# Patient Record
Sex: FEMALE | Race: BLACK OR AFRICAN AMERICAN | NOT HISPANIC OR LATINO | URBAN - METROPOLITAN AREA
[De-identification: names, ages, dates, MRNs, and addresses within clinical notes are randomized per-mention and may not be internally consistent; named-entity substitution may affect disease eponyms.]

---

## 2019-01-02 ENCOUNTER — EMERGENCY (EMERGENCY)
Facility: HOSPITAL | Age: 24
LOS: 1 days | Discharge: HOME | End: 2019-01-02
Attending: EMERGENCY MEDICINE | Admitting: EMERGENCY MEDICINE
Payer: SELF-PAY

## 2019-01-02 VITALS
SYSTOLIC BLOOD PRESSURE: 121 MMHG | TEMPERATURE: 98 F | HEART RATE: 78 BPM | DIASTOLIC BLOOD PRESSURE: 61 MMHG | OXYGEN SATURATION: 100 % | RESPIRATION RATE: 15 BRPM

## 2019-01-02 VITALS
TEMPERATURE: 98 F | HEART RATE: 75 BPM | DIASTOLIC BLOOD PRESSURE: 68 MMHG | RESPIRATION RATE: 18 BRPM | OXYGEN SATURATION: 100 % | SYSTOLIC BLOOD PRESSURE: 126 MMHG

## 2019-01-02 LAB
ALBUMIN SERPL ELPH-MCNC: 3.7 G/DL — SIGNIFICANT CHANGE UP (ref 3.3–5)
ALP SERPL-CCNC: 86 U/L — SIGNIFICANT CHANGE UP (ref 40–120)
ALT FLD-CCNC: 14 U/L — SIGNIFICANT CHANGE UP (ref 4–33)
APPEARANCE UR: CLEAR — SIGNIFICANT CHANGE UP
AST SERPL-CCNC: 18 U/L — SIGNIFICANT CHANGE UP (ref 4–32)
BACTERIA # UR AUTO: SIGNIFICANT CHANGE UP
BASOPHILS # BLD AUTO: 0.03 K/UL — SIGNIFICANT CHANGE UP (ref 0–0.2)
BASOPHILS NFR BLD AUTO: 0.7 % — SIGNIFICANT CHANGE UP (ref 0–2)
BILIRUB SERPL-MCNC: 0.3 MG/DL — SIGNIFICANT CHANGE UP (ref 0.2–1.2)
BILIRUB UR-MCNC: NEGATIVE — SIGNIFICANT CHANGE UP
BLOOD UR QL VISUAL: NEGATIVE — SIGNIFICANT CHANGE UP
BUN SERPL-MCNC: 11 MG/DL — SIGNIFICANT CHANGE UP (ref 7–23)
C TRACH RRNA SPEC QL NAA+PROBE: SIGNIFICANT CHANGE UP
CALCIUM SERPL-MCNC: 8.8 MG/DL — SIGNIFICANT CHANGE UP (ref 8.4–10.5)
CHLORIDE SERPL-SCNC: 105 MMOL/L — SIGNIFICANT CHANGE UP (ref 98–107)
CO2 SERPL-SCNC: 21 MMOL/L — LOW (ref 22–31)
COLOR SPEC: SIGNIFICANT CHANGE UP
CREAT SERPL-MCNC: 0.74 MG/DL — SIGNIFICANT CHANGE UP (ref 0.5–1.3)
EOSINOPHIL # BLD AUTO: 0.27 K/UL — SIGNIFICANT CHANGE UP (ref 0–0.5)
EOSINOPHIL NFR BLD AUTO: 5.9 % — SIGNIFICANT CHANGE UP (ref 0–6)
GLUCOSE SERPL-MCNC: 99 MG/DL — SIGNIFICANT CHANGE UP (ref 70–99)
GLUCOSE UR-MCNC: NEGATIVE — SIGNIFICANT CHANGE UP
HCT VFR BLD CALC: 34.3 % — LOW (ref 34.5–45)
HGB BLD-MCNC: 11 G/DL — LOW (ref 11.5–15.5)
HYALINE CASTS # UR AUTO: NEGATIVE — SIGNIFICANT CHANGE UP
IMM GRANULOCYTES # BLD AUTO: 0.02 # — SIGNIFICANT CHANGE UP
IMM GRANULOCYTES NFR BLD AUTO: 0.4 % — SIGNIFICANT CHANGE UP (ref 0–1.5)
KETONES UR-MCNC: NEGATIVE — SIGNIFICANT CHANGE UP
LEUKOCYTE ESTERASE UR-ACNC: SIGNIFICANT CHANGE UP
LIDOCAIN IGE QN: 26.7 U/L — SIGNIFICANT CHANGE UP (ref 7–60)
LYMPHOCYTES # BLD AUTO: 0.99 K/UL — LOW (ref 1–3.3)
LYMPHOCYTES # BLD AUTO: 21.5 % — SIGNIFICANT CHANGE UP (ref 13–44)
MCHC RBC-ENTMCNC: 26.9 PG — LOW (ref 27–34)
MCHC RBC-ENTMCNC: 32.1 % — SIGNIFICANT CHANGE UP (ref 32–36)
MCV RBC AUTO: 83.9 FL — SIGNIFICANT CHANGE UP (ref 80–100)
MONOCYTES # BLD AUTO: 0.73 K/UL — SIGNIFICANT CHANGE UP (ref 0–0.9)
MONOCYTES NFR BLD AUTO: 15.8 % — HIGH (ref 2–14)
N GONORRHOEA RRNA SPEC QL NAA+PROBE: SIGNIFICANT CHANGE UP
NEUTROPHILS # BLD AUTO: 2.57 K/UL — SIGNIFICANT CHANGE UP (ref 1.8–7.4)
NEUTROPHILS NFR BLD AUTO: 55.7 % — SIGNIFICANT CHANGE UP (ref 43–77)
NITRITE UR-MCNC: NEGATIVE — SIGNIFICANT CHANGE UP
NRBC # FLD: 0 — SIGNIFICANT CHANGE UP
PH UR: 6.5 — SIGNIFICANT CHANGE UP (ref 5–8)
PLATELET # BLD AUTO: 144 K/UL — LOW (ref 150–400)
PMV BLD: 12.6 FL — SIGNIFICANT CHANGE UP (ref 7–13)
POTASSIUM SERPL-MCNC: 4.5 MMOL/L — SIGNIFICANT CHANGE UP (ref 3.5–5.3)
POTASSIUM SERPL-SCNC: 4.5 MMOL/L — SIGNIFICANT CHANGE UP (ref 3.5–5.3)
PROT SERPL-MCNC: 6.4 G/DL — SIGNIFICANT CHANGE UP (ref 6–8.3)
PROT UR-MCNC: NEGATIVE — SIGNIFICANT CHANGE UP
RBC # BLD: 4.09 M/UL — SIGNIFICANT CHANGE UP (ref 3.8–5.2)
RBC # FLD: 13.5 % — SIGNIFICANT CHANGE UP (ref 10.3–14.5)
RBC CASTS # UR COMP ASSIST: SIGNIFICANT CHANGE UP (ref 0–?)
SODIUM SERPL-SCNC: 138 MMOL/L — SIGNIFICANT CHANGE UP (ref 135–145)
SP GR SPEC: 1.01 — SIGNIFICANT CHANGE UP (ref 1–1.04)
SPECIMEN SOURCE: SIGNIFICANT CHANGE UP
SQUAMOUS # UR AUTO: SIGNIFICANT CHANGE UP
UROBILINOGEN FLD QL: NORMAL — SIGNIFICANT CHANGE UP
WBC # BLD: 4.61 K/UL — SIGNIFICANT CHANGE UP (ref 3.8–10.5)
WBC # FLD AUTO: 4.61 K/UL — SIGNIFICANT CHANGE UP (ref 3.8–10.5)
WBC UR QL: HIGH (ref 0–?)

## 2019-01-02 PROCEDURE — 99053 MED SERV 10PM-8AM 24 HR FAC: CPT

## 2019-01-02 PROCEDURE — 99283 EMERGENCY DEPT VISIT LOW MDM: CPT | Mod: 25

## 2019-01-02 RX ORDER — CEPHALEXIN 500 MG
500 CAPSULE ORAL ONCE
Refills: 0 | Status: COMPLETED | OUTPATIENT
Start: 2019-01-02 | End: 2019-01-02

## 2019-01-02 RX ORDER — CEPHALEXIN 500 MG
1 CAPSULE ORAL
Qty: 14 | Refills: 0
Start: 2019-01-02 | End: 2019-01-08

## 2019-01-02 RX ADMIN — Medication 500 MILLIGRAM(S): at 04:40

## 2019-01-02 NOTE — ED PROVIDER NOTE - ATTENDING CONTRIBUTION TO CARE
AJM: Patient seen with resident and agree with above note. 25yo F with no significant PMH p/w abdominal and pelvic pain. pelvic pain has been on/off for a month but today patient began having midline, suprapubic abdominal pain as well. Denies fever or chills or dysuria but reports incomplete voiding and increased frequency. Patient reports no significant change in vaginal discharge but potential change in odor. unremarkable pelvic exam with RN chaperone. IUD strings palpable. will check palbs, ua, chlam/gc AJM: Patient seen with resident and agree with above note. 23yo F with no significant PMH p/w abdominal and pelvic pain. pelvic pain has been on/off for a month but today patient began having midline, suprapubic abdominal pain as well. Denies fever or chills or dysuria but reports incomplete voiding and increased frequency. Patient reports no significant change in vaginal discharge but potential change in odor. unremarkable pelvic exam with RN chaperone. IUD strings palpable. will check palbs, ua, chlam/gc

## 2019-01-02 NOTE — ED PROVIDER NOTE - OBJECTIVE STATEMENT
23yo F with no significant PMH p/w abdominal and pelvic pain. pelvic pain has been on/off for a month but today patient began having midline, suprapubic abdominal pain as well. Denies fever or chills or dysuria but reports incomplete voiding and increased frequency. Patient reports no significant change in vaginal discharge but potential change in odor 25yo F with no significant PMH p/w abdominal and pelvic pain. pelvic pain has been on/off for a month but today patient began having midline, suprapubic abdominal pain as well. Denies fever or chills or dysuria but reports incomplete voiding and increased frequency. Patient reports no significant change in vaginal discharge but potential change in odor

## 2019-01-02 NOTE — ED PROVIDER NOTE - NS ED ROS FT
Positive: abdominal pain, pelvic pain, incomplete voiding, increased frequency    All other review of systems is negative unless indicated above.

## 2019-01-02 NOTE — ED PROVIDER NOTE - PHYSICAL EXAMINATION
GENERAL: No acute distress, well-developed  HEAD:  Atraumatic, Normocephalic  ENT: EOMI, conjunctiva and sclera clear, Neck supple, No JVD, moist mucosa  CHEST/LUNG: Clear to auscultation bilaterally  HEART: Regular rate and rhythm; No murmurs, rubs, or gallops  ABDOMEN: Soft, Nontender, Nondistended; Bowel sounds present  EXTREMITIES:  No clubbing, cyanosis, or edema  PSYCH: Nl behavior, nl affect  NEUROLOGY: AAOx3, non-focal, cranial nerves intact  SKIN: Normal color, No rashes or lesions  : no malodorous discharge, bleeding on speculum exam. IUD strings palpated

## 2019-01-02 NOTE — ED PROVIDER NOTE - NSFOLLOWUPINSTRUCTIONS_ED_ALL_ED_FT
Please continue taking your medications as prescribed.  Please follow-up with your PMD within 1 week.  Please return to the ED and seek immediate medical care if you experience new or worsening symptoms including: fever, chills, flank pain.

## 2019-01-02 NOTE — ED PROVIDER NOTE - MEDICAL DECISION MAKING DETAILS
23yo F with IUD p/w abd/pelvic pain. Non-toxic appearance and no significant tenderness on palpation. Pelvic exam w/o significant discharge or bleeding. UA w/ LE and WBC, with symptoms concern for UTI/Cysititis. Check blood work, GC/Chlamydia. Likely discharge pending workup. 25yo F with IUD p/w abd/pelvic pain. Non-toxic appearance and no significant tenderness on palpation. Pelvic exam w/o significant discharge or bleeding. UA w/ LE and WBC, with symptoms concern for UTI/Cysititis. Check blood work, GC/Chlamydia. Likely discharge pending workup.

## 2019-01-02 NOTE — ED ADULT NURSE NOTE - NSIMPLEMENTINTERV_GEN_ALL_ED
Implemented All Universal Safety Interventions:  [place] to call system. Call bell, personal items and telephone within reach. Instruct patient to call for assistance. Room bathroom lighting operational. Non-slip footwear when patient is off stretcher. Physically safe environment: no spills, clutter or unnecessary equipment. Stretcher in lowest position, wheels locked, appropriate side rails in place. Implemented All Universal Safety Interventions:  [place] to call system. Call bell, personal items and telephone within reach. Instruct patient to call for assistance. Room bathroom lighting operational. Non-slip footwear when patient is off stretcher. Physically safe environment: no spills, clutter or unnecessary equipment. Stretcher in lowest position, wheels locked, appropriate side rails in place. Implemented All Universal Safety Interventions:  [place] to call system. Call bell, personal items and telephone within reach. Instruct patient to call for assistance. Room bathroom lighting operational. Non-slip footwear when patient is off stretcher. Physically safe environment: no spills, clutter or unnecessary equipment. Stretcher in lowest position, wheels locked, appropriate side rails in place.

## 2019-01-02 NOTE — ED ADULT TRIAGE NOTE - CHIEF COMPLAINT QUOTE
Pt. w/ n PMH c/o abdominal pain with pelvic pain. Pt. states she has had pelvic pain for 1x month but today started to have abdominal pain. Pt. denies any N/V , denies any vag bleeding and burning during urination. Pt. states she has had 3x years with a IUD and states she is concern pain could be related to IUD. Pt. appears in NAD in triage.

## 2019-01-03 LAB
BACTERIA UR CULT: SIGNIFICANT CHANGE UP
SPECIMEN SOURCE: SIGNIFICANT CHANGE UP

## 2019-01-05 LAB
BACTERIA GENITAL AEROBE CULT: SIGNIFICANT CHANGE UP

## 2019-03-22 ENCOUNTER — EMERGENCY (EMERGENCY)
Facility: HOSPITAL | Age: 24
LOS: 1 days | Discharge: HOME | End: 2019-03-22
Attending: EMERGENCY MEDICINE | Admitting: EMERGENCY MEDICINE
Payer: SELF-PAY

## 2019-03-22 VITALS
SYSTOLIC BLOOD PRESSURE: 127 MMHG | HEART RATE: 66 BPM | OXYGEN SATURATION: 100 % | DIASTOLIC BLOOD PRESSURE: 79 MMHG | TEMPERATURE: 98 F | RESPIRATION RATE: 18 BRPM

## 2019-03-22 PROCEDURE — 99283 EMERGENCY DEPT VISIT LOW MDM: CPT

## 2019-03-22 RX ORDER — AMOXICILLIN 250 MG/5ML
1 SUSPENSION, RECONSTITUTED, ORAL (ML) ORAL
Qty: 20 | Refills: 0
Start: 2019-03-22 | End: 2019-03-31

## 2019-03-22 RX ORDER — OXYCODONE AND ACETAMINOPHEN 5; 325 MG/1; MG/1
1 TABLET ORAL
Qty: 9 | Refills: 0
Start: 2019-03-22 | End: 2019-03-24

## 2019-03-23 RX ORDER — AMOXICILLIN 250 MG/5ML
500 SUSPENSION, RECONSTITUTED, ORAL (ML) ORAL ONCE
Refills: 0 | Status: COMPLETED | OUTPATIENT
Start: 2019-03-23 | End: 2019-03-23

## 2019-03-23 RX ORDER — OXYCODONE AND ACETAMINOPHEN 5; 325 MG/1; MG/1
1 TABLET ORAL ONCE
Refills: 0 | Status: DISCONTINUED | OUTPATIENT
Start: 2019-03-23 | End: 2019-03-23

## 2019-03-23 RX ADMIN — Medication 500 MILLIGRAM(S): at 00:11

## 2019-03-23 RX ADMIN — OXYCODONE AND ACETAMINOPHEN 1 TABLET(S): 5; 325 TABLET ORAL at 00:11

## 2019-03-23 NOTE — ED ADULT NURSE NOTE - NSFALLRSKASSESSDT_ED_ALL_ED
23-Mar-2019 00:10 Coronary artery disease involving native coronary artery of native heart without angina pectoris

## 2019-03-23 NOTE — ED PROVIDER NOTE - CLINICAL SUMMARY MEDICAL DECISION MAKING FREE TEXT BOX
adult F with dental caries, no signs of abscess.  normal VS.  plan:  rx percocet x 3d, amoxicillin.  dental referral. return precautions.

## 2019-03-23 NOTE — ED PROVIDER NOTE - NSFOLLOWUPINSTRUCTIONS_ED_ALL_ED_FT
Call the [facility] Dental clinic tomorrow morning to arrange follow up .  Take the antibiotics to limit the dental infection.  Take ibuprofen 400mg, every 6 hrs, as needed for dental pain.  You may take percocet (5/325) every 8hrs, as needed for breakthrough dental pain. Call the [facility] Dental clinic tomorrow morning to arrange follow up .  Take the antibiotics to limit the dental infection.  Take ibuprofen 400mg, every 6 hrs, as needed for dental pain.  You may take percocet (5/325) every 8hrs, as needed for breakthrough dental pain. Call the [facility] Dental clinic tomorrow morning to arrange follow up .  Take the antibiotics to limit the dental infection.  Take ibuprofen 400mg, every 6 hrs, as needed for dental pain.  You may take percocet (5/325) every 8hrs, as needed for breakthrough dental pain.

## 2019-03-23 NOTE — ED ADULT NURSE NOTE - OBJECTIVE STATEMENT
pt brought into room 10c. A&OX4 ambulatory self care female presents to the ED for dental pain that has worsens for months patients mouth minimally swollen. PT appears uncomfortable. UCG Negative. Medication given as ordered.

## 2019-03-23 NOTE — ED ADULT NURSE NOTE - NSIMPLEMENTINTERV_GEN_ALL_ED
Implemented All Universal Safety Interventions:  [place] to call system. Call bell, personal items and telephone within reach. Instruct patient to call for assistance. Room bathroom lighting operational. Non-slip footwear when patient is off stretcher. Physically safe environment: no spills, clutter or unnecessary equipment. Stretcher in lowest position, wheels locked, appropriate side rails in place. Implemented All Universal Safety Interventions:  [place] to call system. Call bell, personal items and telephone within reach. Instruct patient to call for assistance. Room bathroom lighting operational. Non-slip footwear when patient is off stretcher. Physically safe environment: no spills, clutter or unnecessary equipment. Stretcher in lowest position, wheels locked, appropriate side rails in place. Implemented All Universal Safety Interventions:  [place] to call system. Call bell, personal items and telephone within reach. Instruct patient to call for assistance. Room bathroom lighting operational. Non-slip footwear when patient is off stretcher. Physically safe environment: no spills, clutter or unnecessary equipment. Stretcher in lowest position, wheels locked, appropriate side rails in place.

## 2019-03-23 NOTE — ED PROVIDER NOTE - NS ED MD EM SELECTION
82721 Exp Problem Focused - Mod. Complex 53171 Exp Problem Focused - Mod. Complex 12792 Exp Problem Focused - Mod. Complex

## 2019-03-23 NOTE — ED PROVIDER NOTE - OBJECTIVE STATEMENT
Duration: weeks.  Quality: throbbing.  Location:  L upper 1st bicuspid, L lower 2nd bicuspid.  Associated Sx: no f/c, no nausea/vomiting, no facial swelling.    Hasn't seen dentist b/c doesn't have insurance (until today).

## 2019-03-23 NOTE — ED PROVIDER NOTE - NSFOLLOWUPCLINICS_GEN_ALL_ED_FT
[facility]  Dental  [address] [place] [address]  Phone: [phone]  Fax:   Follow Up Time: [facility]  Dental  [address] [place] [id number]  Phone: [phone]  Fax:   Follow Up Time: [facility]  Dental  [address] [place] [address]  Phone: [phone]  Fax:   Follow Up Time:

## 2021-05-03 NOTE — ED ADULT NURSE NOTE - CHIEF COMPLAINT QUOTE
Health Maintenance Due   Topic Date Due   • Shingles Vaccine (1 of 2) Never done   • Depression Screening  10/20/2021       Patient is due for topics as listed above but is not proceeding with Immunization(s) Shingles at this time.          Pt. w/ n PMH c/o abdominal pain with pelvic pain. Pt. states she has had pelvic pain for 1x month but today started to have abdominal pain. Pt. denies any N/V , denies any vag bleeding and burning during urination. Pt. states she has had 3x years with a IUD and states she is concern pain could be related to IUD. Pt. appears in NAD in triage.

## 2023-08-29 ENCOUNTER — EMERGENCY (EMERGENCY)
Facility: HOSPITAL | Age: 28
LOS: 1 days | Discharge: HOME | End: 2023-08-29
Admitting: STUDENT IN AN ORGANIZED HEALTH CARE EDUCATION/TRAINING PROGRAM
Payer: MEDICAID

## 2023-08-29 VITALS
DIASTOLIC BLOOD PRESSURE: 79 MMHG | OXYGEN SATURATION: 100 % | SYSTOLIC BLOOD PRESSURE: 115 MMHG | RESPIRATION RATE: 16 BRPM | TEMPERATURE: 98 F | HEART RATE: 60 BPM

## 2023-08-29 VITALS
OXYGEN SATURATION: 99 % | DIASTOLIC BLOOD PRESSURE: 95 MMHG | HEART RATE: 77 BPM | TEMPERATURE: 99 F | SYSTOLIC BLOOD PRESSURE: 140 MMHG | RESPIRATION RATE: 16 BRPM

## 2023-08-29 LAB
ALBUMIN SERPL ELPH-MCNC: 4 G/DL — SIGNIFICANT CHANGE UP (ref 3.3–5)
ALP SERPL-CCNC: 87 U/L — SIGNIFICANT CHANGE UP (ref 40–120)
ALT FLD-CCNC: 11 U/L — SIGNIFICANT CHANGE UP (ref 4–33)
ANION GAP SERPL CALC-SCNC: 15 MMOL/L — HIGH (ref 7–14)
APPEARANCE UR: ABNORMAL
AST SERPL-CCNC: 26 U/L — SIGNIFICANT CHANGE UP (ref 4–32)
BASOPHILS # BLD AUTO: 0.01 K/UL — SIGNIFICANT CHANGE UP (ref 0–0.2)
BASOPHILS NFR BLD AUTO: 0.3 % — SIGNIFICANT CHANGE UP (ref 0–2)
BILIRUB SERPL-MCNC: 0.5 MG/DL — SIGNIFICANT CHANGE UP (ref 0.2–1.2)
BILIRUB UR-MCNC: NEGATIVE — SIGNIFICANT CHANGE UP
BLD GP AB SCN SERPL QL: NEGATIVE — SIGNIFICANT CHANGE UP
BUN SERPL-MCNC: 9 MG/DL — SIGNIFICANT CHANGE UP (ref 7–23)
CALCIUM SERPL-MCNC: 8.8 MG/DL — SIGNIFICANT CHANGE UP (ref 8.4–10.5)
CHLORIDE SERPL-SCNC: 103 MMOL/L — SIGNIFICANT CHANGE UP (ref 98–107)
CO2 SERPL-SCNC: 18 MMOL/L — LOW (ref 22–31)
COLOR SPEC: YELLOW — SIGNIFICANT CHANGE UP
CREAT SERPL-MCNC: 0.73 MG/DL — SIGNIFICANT CHANGE UP (ref 0.5–1.3)
DIFF PNL FLD: ABNORMAL
EGFR: 113 ML/MIN/1.73M2 — SIGNIFICANT CHANGE UP
EOSINOPHIL # BLD AUTO: 0.09 K/UL — SIGNIFICANT CHANGE UP (ref 0–0.5)
EOSINOPHIL NFR BLD AUTO: 2.5 % — SIGNIFICANT CHANGE UP (ref 0–6)
GLUCOSE SERPL-MCNC: 96 MG/DL — SIGNIFICANT CHANGE UP (ref 70–99)
GLUCOSE UR QL: NEGATIVE MG/DL — SIGNIFICANT CHANGE UP
HCG SERPL-ACNC: 44.6 MIU/ML — SIGNIFICANT CHANGE UP
HCT VFR BLD CALC: 40.3 % — SIGNIFICANT CHANGE UP (ref 34.5–45)
HGB BLD-MCNC: 13.3 G/DL — SIGNIFICANT CHANGE UP (ref 11.5–15.5)
IANC: 2.45 K/UL — SIGNIFICANT CHANGE UP (ref 1.8–7.4)
IMM GRANULOCYTES NFR BLD AUTO: 0.6 % — SIGNIFICANT CHANGE UP (ref 0–0.9)
KETONES UR-MCNC: ABNORMAL MG/DL
LEUKOCYTE ESTERASE UR-ACNC: ABNORMAL
LYMPHOCYTES # BLD AUTO: 0.73 K/UL — LOW (ref 1–3.3)
LYMPHOCYTES # BLD AUTO: 20.6 % — SIGNIFICANT CHANGE UP (ref 13–44)
MCHC RBC-ENTMCNC: 29.4 PG — SIGNIFICANT CHANGE UP (ref 27–34)
MCHC RBC-ENTMCNC: 33 GM/DL — SIGNIFICANT CHANGE UP (ref 32–36)
MCV RBC AUTO: 89 FL — SIGNIFICANT CHANGE UP (ref 80–100)
MONOCYTES # BLD AUTO: 0.25 K/UL — SIGNIFICANT CHANGE UP (ref 0–0.9)
MONOCYTES NFR BLD AUTO: 7 % — SIGNIFICANT CHANGE UP (ref 2–14)
NEUTROPHILS # BLD AUTO: 2.45 K/UL — SIGNIFICANT CHANGE UP (ref 1.8–7.4)
NEUTROPHILS NFR BLD AUTO: 69 % — SIGNIFICANT CHANGE UP (ref 43–77)
NITRITE UR-MCNC: NEGATIVE — SIGNIFICANT CHANGE UP
NRBC # BLD: 0 /100 WBCS — SIGNIFICANT CHANGE UP (ref 0–0)
NRBC # FLD: 0 K/UL — SIGNIFICANT CHANGE UP (ref 0–0)
PH UR: 6 — SIGNIFICANT CHANGE UP (ref 5–8)
PLATELET # BLD AUTO: 187 K/UL — SIGNIFICANT CHANGE UP (ref 150–400)
POTASSIUM SERPL-MCNC: 4.7 MMOL/L — SIGNIFICANT CHANGE UP (ref 3.5–5.3)
POTASSIUM SERPL-SCNC: 4.7 MMOL/L — SIGNIFICANT CHANGE UP (ref 3.5–5.3)
PROT SERPL-MCNC: 7.3 G/DL — SIGNIFICANT CHANGE UP (ref 6–8.3)
PROT UR-MCNC: SIGNIFICANT CHANGE UP MG/DL
RBC # BLD: 4.53 M/UL — SIGNIFICANT CHANGE UP (ref 3.8–5.2)
RBC # FLD: 12.6 % — SIGNIFICANT CHANGE UP (ref 10.3–14.5)
RH IG SCN BLD-IMP: POSITIVE — SIGNIFICANT CHANGE UP
SODIUM SERPL-SCNC: 136 MMOL/L — SIGNIFICANT CHANGE UP (ref 135–145)
SP GR SPEC: 1.02 — SIGNIFICANT CHANGE UP (ref 1–1.03)
UROBILINOGEN FLD QL: 0.2 MG/DL — SIGNIFICANT CHANGE UP (ref 0.2–1)
WBC # BLD: 3.55 K/UL — LOW (ref 3.8–10.5)
WBC # FLD AUTO: 3.55 K/UL — LOW (ref 3.8–10.5)

## 2023-08-29 PROCEDURE — 76830 TRANSVAGINAL US NON-OB: CPT | Mod: 26

## 2023-08-29 PROCEDURE — 99285 EMERGENCY DEPT VISIT HI MDM: CPT

## 2023-08-29 RX ORDER — CEPHALEXIN 500 MG
500 CAPSULE ORAL ONCE
Refills: 0 | Status: COMPLETED | OUTPATIENT
Start: 2023-08-29 | End: 2023-08-29

## 2023-08-29 RX ORDER — CEPHALEXIN 500 MG
1 CAPSULE ORAL
Qty: 28 | Refills: 0
Start: 2023-08-29 | End: 2023-09-04

## 2023-08-29 RX ADMIN — Medication 500 MILLIGRAM(S): at 15:56

## 2023-08-29 NOTE — ED ADULT NURSE NOTE - OBJECTIVE STATEMENT
pt is a 28y old female received awake and responsive, c/o of vag bleed since last night, pt denies any other discomfort

## 2023-08-29 NOTE — ED PROVIDER NOTE - PATIENT PORTAL LINK FT
You can access the FollowMyHealth Patient Portal offered by [facility] by registering at the following website: [email or web address] By joining [email or web address]’s FollowMyHealth portal, you will also be able to view your health information using other applications (apps) compatible with our system. You can access the FollowMyHealth Patient Portal offered by [facility] by registering at the following website: [email or web address] By joining [employer]’s FollowMyHealth portal, you will also be able to view your health information using other applications (apps) compatible with our system. You can access the FollowMyHealth Patient Portal offered by [facility] by registering at the following website: [email or web address] By joining [employer]’s FollowMyHealth portal, you will also be able to view your health information using other applications (apps) compatible with our system.

## 2023-08-29 NOTE — ED ADULT NURSE NOTE - NSFALLUNIVINTERV_ED_ALL_ED
Bed/Stretcher in lowest position, wheels locked, appropriate side rails in place/Call bell, personal items and telephone in reach/Instruct patient to call for assistance before getting out of bed/chair/stretcher/Non-slip footwear applied when patient is off stretcher/[place] to call system/Physically safe environment - no spills, clutter or unnecessary equipment/Purposeful proactive rounding/Room/bathroom lighting operational, light cord in reach Bed/Stretcher in lowest position, wheels locked, appropriate side rails in place/Call bell, personal items and telephone in reach/Instruct patient to call for assistance before getting out of bed/chair/stretcher/Non-slip footwear applied when patient is off stretcher/[place] to call system/Physically safe environment - no spills, clutter or unnecessary equipment/Purposeful proactive rounding/Room/bathroom lighting operational, light cord in reach Bed/Stretcher in lowest position, wheels locked, appropriate side rails in place/Call bell, personal items and telephone in reach/Instruct patient to call for assistance before getting out of bed/chair/stretcher/Non-slip footwear applied when patient is off stretcher/[place] to call system/Physically safe environment - no spills, clutter or unnecessary equipment/Purposeful proactive rounding/Room/bathroom lighting operational, light cord in reach

## 2023-08-29 NOTE — ED PROVIDER NOTE - CLINICAL SUMMARY MEDICAL DECISION MAKING FREE TEXT BOX
27 y/o female with +ucg at home this week LMP 7/2/23 OBGYN at [facility] no confirmed IUP presents to ED c/o pelvic cramping and vaginal bleeding x 2 days. Pt states she hasn't used a pad yet but feels heavy like a period. Denies clots. States had painful cramping last night better today. pt hemodynamically stable. plan to check labs including hcg, type and screen, TVUS r/o ectopic pregnancy 27 y/o female with +ucg at home this week LMP 7/2/23 OBGYN at [facility] no confirmed IUP presents to ED c/o pelvic cramping and vaginal bleeding x 2 days. Pt states she hasn't used a pad yet but feels heavy like a period. Denies clots. States had painful cramping last night better today. pt hemodynamically stable. plan to check labs including hcg, type and screen, TVUS r/o ectopic pregnancy 27 y/o female with +ucg at home this week LMP 7/2/23 OBGYN at [facility] no confirmed IUP presents to ED c/o pelvic cramping and vaginal bleeding x 2 days. Pt states she hasn't used a pad yet but feels heavy like a period. Denies clots. States had painful cramping last night better today. pt hemodynamically stable. plan to check labs including hcg, type and screen, TVUS r/o ectopic pregnancy

## 2023-08-29 NOTE — ED PROVIDER NOTE - NSFOLLOWUPINSTRUCTIONS_ED_ALL_ED_FT
Follow up at OBGYN clinic or return to ER in 2 days for repeat blood work     Worsening, continued or new concerning symptoms return to the emergency department.

## 2023-08-29 NOTE — ED ADULT TRIAGE NOTE - CHIEF COMPLAINT QUOTE
pt coming with Vag. bleeding started last night with ABD cramping and bleeding this morning LMP 7/2023,   Home preg. test last wk and yest + preg.

## 2023-08-29 NOTE — CONSULT NOTE ADULT - SUBJECTIVE AND OBJECTIVE BOX
INCOMPLETE NOTE    CRYSTAL LOUISJUSTE  28y  Female [id number]    HPI:        Name of GYN Physician:     POB:      Pgyn: Denies fibroids, cysts, endometriosis, STI's, Abnormal pap smears     Home meds:     Hospital Meds:   MEDICATIONS  (STANDING):    MEDICATIONS  (PRN):      Allergies    No Known Drug Allergies  spices (Swelling)    Intolerances        PAST MEDICAL & SURGICAL HISTORY:  No pertinent past medical history      No significant past surgical history          Social History:  Denies smoking use, drug use, alcohol use.   +occasional social alcohol use    Vital Signs Last 24 Hrs  T(C): 37.1 (29 Aug 2023 09:10), Max: 37.1 (29 Aug 2023 09:10)  T(F): 98.7 (29 Aug 2023 09:10), Max: 98.7 (29 Aug 2023 09:10)  HR: 77 (29 Aug 2023 09:10) (77 - 77)  BP: 140/95 (29 Aug 2023 09:10) (140/95 - 140/95)  BP(mean): --  RR: 16 (29 Aug 2023 09:10) (16 - 16)  SpO2: 99% (29 Aug 2023 09:10) (99% - 99%)    Parameters below as of 29 Aug 2023 09:10  Patient On (Oxygen Delivery Method): room air        Physical Exam:   General: sitting comftorably in bed, NAD   HEENT: neck supple, full ROM  CV: RR S1S2 no m/r/g  Lungs: CTA b/l, good air flow b/l   Back: No CVA tenderness  Abd: Soft, non-tender, non-distended.  Bowel sounds present.    :  No bleeding on pad.    External labia wnl.  Bimanual exam with no cervical motion tenderness, uterus wnl, adnexa non palpable b/l.  Cervix closed vs. Cervix dilated    cm   Speculum Exam: No active bleeding from os.  Physiologic discharge.    Ext: non-tender b/l, no edema     LABS:                              13.3   3.55  )-----------( 187      ( 29 Aug 2023 09:55 )             40.3     08-29    136  |  103  |  9   ----------------------------<  96  4.7   |  18<L>  |  0.73    Ca    8.8      29 Aug 2023 09:55    TPro  7.3  /  Alb  4.0  /  TBili  0.5  /  DBili  x   /  AST  26  /  ALT  11  /  AlkPhos  87  08-29    I&O's Detail      Urinalysis Basic - ( 29 Aug 2023 09:55 )    Color: x / Appearance: x / SG: x / pH: x  Gluc: 96 mg/dL / Ketone: x  / Bili: x / Urobili: x   Blood: x / Protein: x / Nitrite: x   Leuk Esterase: x / RBC: x / WBC x   Sq Epi: x / Non Sq Epi: x / Bacteria: x        RADIOLOGY & ADDITIONAL STUDIES:      Heather Quinonez, PGY1 INCOMPLETE NOTE    CRYSTAL LOUISJUSTE  28y  Female [id number]    HPI:        Name of GYN Physician:     POB:      Pgyn: Denies fibroids, cysts, endometriosis, STI's, Abnormal pap smears     Home meds:     Hospital Meds:   MEDICATIONS  (STANDING):    MEDICATIONS  (PRN):      Allergies    No Known Drug Allergies  spices (Swelling)    Intolerances        PAST MEDICAL & SURGICAL HISTORY:  No pertinent past medical history      No significant past surgical history          Social History:  Denies smoking use, drug use, alcohol use.   +occasional social alcohol use    Vital Signs Last 24 Hrs  T(C): 37.1 (29 Aug 2023 09:10), Max: 37.1 (29 Aug 2023 09:10)  T(F): 98.7 (29 Aug 2023 09:10), Max: 98.7 (29 Aug 2023 09:10)  HR: 77 (29 Aug 2023 09:10) (77 - 77)  BP: 140/95 (29 Aug 2023 09:10) (140/95 - 140/95)  BP(mean): --  RR: 16 (29 Aug 2023 09:10) (16 - 16)  SpO2: 99% (29 Aug 2023 09:10) (99% - 99%)    Parameters below as of 29 Aug 2023 09:10  Patient On (Oxygen Delivery Method): room air        Physical Exam:   General: sitting comftorably in bed, NAD   HEENT: neck supple, full ROM  CV: RR S1S2 no m/r/g  Lungs: CTA b/l, good air flow b/l   Back: No CVA tenderness  Abd: Soft, non-tender, non-distended.  Bowel sounds present.    :  No bleeding on pad.    External labia wnl.  Bimanual exam with no cervical motion tenderness, uterus wnl, adnexa non palpable b/l.  Cervix closed vs. Cervix dilated    cm   Speculum Exam: No active bleeding from os.  Physiologic discharge.    Ext: non-tender b/l, no edema     LABS:                              13.3   3.55  )-----------( 187      ( 29 Aug 2023 09:55 )             40.3     08-29    136  |  103  |  9   ----------------------------<  96  4.7   |  18<L>  |  0.73    Ca    8.8      29 Aug 2023 09:55    TPro  7.3  /  Alb  4.0  /  TBili  0.5  /  DBili  x   /  AST  26  /  ALT  11  /  AlkPhos  87  08-29    I&O's Detail      Urinalysis Basic - ( 29 Aug 2023 09:55 )    Color: x / Appearance: x / SG: x / pH: x  Gluc: 96 mg/dL / Ketone: x  / Bili: x / Urobili: x   Blood: x / Protein: x / Nitrite: x   Leuk Esterase: x / RBC: x / WBC x   Sq Epi: x / Non Sq Epi: x / Bacteria: x        RADIOLOGY & ADDITIONAL STUDIES:      Heather Quinonez, PGY1 INCOMPLETE NOTE    CRYSTAL LOUISJUSTE  28y  Female [id number]    HPI:        Name of GYN Physician:     POB:      Pgyn: Denies fibroids, cysts, endometriosis, STI's, Abnormal pap smears     Home meds:     Hospital Meds:   MEDICATIONS  (STANDING):    MEDICATIONS  (PRN):      Allergies    No Known Drug Allergies  spices (Swelling)    Intolerances        PAST MEDICAL & SURGICAL HISTORY:  No pertinent past medical history      No significant past surgical history          Social History:  Denies smoking use, drug use, alcohol use.   +occasional social alcohol use    Vital Signs Last 24 Hrs  T(C): 37.1 (29 Aug 2023 09:10), Max: 37.1 (29 Aug 2023 09:10)  T(F): 98.7 (29 Aug 2023 09:10), Max: 98.7 (29 Aug 2023 09:10)  HR: 77 (29 Aug 2023 09:10) (77 - 77)  BP: 140/95 (29 Aug 2023 09:10) (140/95 - 140/95)  BP(mean): --  RR: 16 (29 Aug 2023 09:10) (16 - 16)  SpO2: 99% (29 Aug 2023 09:10) (99% - 99%)    Parameters below as of 29 Aug 2023 09:10  Patient On (Oxygen Delivery Method): room air        Physical Exam:   General: sitting comftorably in bed, NAD   HEENT: neck supple, full ROM  CV: RR S1S2 no m/r/g  Lungs: CTA b/l, good air flow b/l   Back: No CVA tenderness  Abd: Soft, non-tender, non-distended.  Bowel sounds present.    :  No bleeding on pad.    External labia wnl.  Bimanual exam with no cervical motion tenderness, uterus wnl, adnexa non palpable b/l.  Cervix closed vs. Cervix dilated    cm   Speculum Exam: No active bleeding from os.  Physiologic discharge.    Ext: non-tender b/l, no edema     LABS:                              13.3   3.55  )-----------( 187      ( 29 Aug 2023 09:55 )             40.3     08-29    136  |  103  |  9   ----------------------------<  96  4.7   |  18<L>  |  0.73    Ca    8.8      29 Aug 2023 09:55    TPro  7.3  /  Alb  4.0  /  TBili  0.5  /  DBili  x   /  AST  26  /  ALT  11  /  AlkPhos  87  08-29    I&O's Detail      Urinalysis Basic - ( 29 Aug 2023 09:55 )    Color: x / Appearance: x / SG: x / pH: x  Gluc: 96 mg/dL / Ketone: x  / Bili: x / Urobili: x   Blood: x / Protein: x / Nitrite: x   Leuk Esterase: x / RBC: x / WBC x   Sq Epi: x / Non Sq Epi: x / Bacteria: x        RADIOLOGY & ADDITIONAL STUDIES:      Heather Quinonez, PGY1 SARAH MERCADO  28y  Female [id number]    HPI: 27yo  LMP 7/2 @8w2d presenting with vaginal bleeding. She had a positive pregnancy test at home last Wednesday. She started having mild abdominal cramping a few days ago. Today she started to have light vaginal bleeding. She put on her first pad right before coming to the ED and it is not fully saturated. She denies passing any tissue. She denies dizziness, lightheadedness, chest pain, SOB, nausea, vomiting.         Name of GYN Physician: [facility] on [facility]    POB:   x1, TOP s/p D&C x1    Pgyn: +hx of fibroids. Denies cysts, STI's, Abnormal pap smears     Home meds: none    Allergies    No Known Drug Allergies  spices (Swelling)      PAST MEDICAL & SURGICAL HISTORY:  HLD    D&C for TOP    Removal of retained piece of IUD Dec 2022          Social History:  Denies smoking use. +social alcohol use. + social marijuana use        Vital Signs Last 24 Hrs  T(C): 37.1 (29 Aug 2023 09:10), Max: 37.1 (29 Aug 2023 09:10)  T(F): 98.7 (29 Aug 2023 09:10), Max: 98.7 (29 Aug 2023 09:10)  HR: 77 (29 Aug 2023 09:10) (77 - 77)  BP: 140/95 (29 Aug 2023 09:10) (140/95 - 140/95)  BP(mean): --  RR: 16 (29 Aug 2023 09:10) (16 - 16)  SpO2: 99% (29 Aug 2023 09:10) (99% - 99%)    Parameters below as of 29 Aug 2023 09:10  Patient On (Oxygen Delivery Method): room air        Physical Exam:   General: sitting comftorably in bed, NAD   Abd: Soft, non-tender, non-distended.  :  Pad 30% saturated.   External labia wnl.  Bimanual exam with no cervical motion tenderness, 8wk size bulky uterus, R sided adnexal tenderness to palpation, adnexa non palpable b/l.  Cervix closed   Speculum Exam: 10cc blood in vaginal vault No active bleeding from os.        LABS:                        13.3   3.55  )-----------( 187      ( 29 Aug 2023 09:55 )             40.3         136  |  103  |  9   ----------------------------<  96  4.7   |  18<L>  |  0.73    Ca    8.8      29 Aug 2023 09:55    TPro  7.3  /  Alb  4.0  /  TBili  0.5  /  DBili  x   /  AST  26  /  ALT  11  /  AlkPhos  87      I&O's Detail      Urinalysis Basic - ( 29 Aug 2023 09:55 )    Color: x / Appearance: x / SG: x / pH: x  Gluc: 96 mg/dL / Ketone: x  / Bili: x / Urobili: x   Blood: x / Protein: x / Nitrite: x   Leuk Esterase: x / RBC: x / WBC x   Sq Epi: x / Non Sq Epi: x / Bacteria: x        RADIOLOGY & ADDITIONAL STUDIES:    TVUS()  FINDINGS:  Uterus: 9.3 cm x 5.2 cm x 7.0 cm. Posterior intramural fibroid measuring   2.4 x 2.3 x 2.2 cm. Left-sided intramural fibroid measuring 4.4 x 3.4 x   3.7 cm with internal cystic degeneration. Right sided intramural fibroid   measuring 1.8 x 1.7 x 1.7 cm.  Endometrium: 3 mm. No intrauterine gestational sac is visualized.    Right ovary: 3.7 cm x 1.6 cm x 2.7 cm. Within normal limits.  Left ovary: 3.0 cm x 1.7 cm x 1.6 cm. 1.3 x 1.3 x 1.0 cm corpus luteum.    Fluid: Small amount of pelvic fluid.    IMPRESSION:  Pregnancy of unknown location. Differential diagnosis includes early IUP,   pregnancy failure, or nonvisualized ectopic pregnancy. Recommend   follow-up with serial hCG and pelvic ultrasound.    Multiple uterine fibroids with left-sided fibroid with cystic   degeneration.   SARAH MERCADO  28y  Female [id number]    HPI: 27yo  LMP 7/2 @8w2d presenting with vaginal bleeding. She had a positive pregnancy test at home last Wednesday. She started having mild abdominal cramping a few days ago. Today she started to have light vaginal bleeding. She put on her first pad right before coming to the ED and it is not fully saturated. She denies passing any tissue. She denies dizziness, lightheadedness, chest pain, SOB, nausea, vomiting.         Name of GYN Physician: [facility] on [facility]    POB:   x1, TOP s/p D&C x1    Pgyn: +hx of fibroids. Denies cysts, STI's, Abnormal pap smears     Home meds: none    Allergies    No Known Drug Allergies  spices (Swelling)      PAST MEDICAL & SURGICAL HISTORY:  HLD    D&C for TOP    Removal of retained piece of IUD Dec 2022          Social History:  Denies smoking use. +social alcohol use. + social marijuana use        Vital Signs Last 24 Hrs  T(C): 37.1 (29 Aug 2023 09:10), Max: 37.1 (29 Aug 2023 09:10)  T(F): 98.7 (29 Aug 2023 09:10), Max: 98.7 (29 Aug 2023 09:10)  HR: 77 (29 Aug 2023 09:10) (77 - 77)  BP: 140/95 (29 Aug 2023 09:10) (140/95 - 140/95)  BP(mean): --  RR: 16 (29 Aug 2023 09:10) (16 - 16)  SpO2: 99% (29 Aug 2023 09:10) (99% - 99%)    Parameters below as of 29 Aug 2023 09:10  Patient On (Oxygen Delivery Method): room air        Physical Exam:   General: sitting comftorably in bed, NAD   Abd: Soft, non-tender, non-distended.  :  Pad 30% saturated.   External labia wnl.  Bimanual exam with no cervical motion tenderness, 8wk size bulky uterus, R sided adnexal tenderness to palpation, adnexa non palpable b/l.  Cervix closed   Speculum Exam: 10cc blood in vaginal vault No active bleeding from os.        LABS:                        13.3   3.55  )-----------( 187      ( 29 Aug 2023 09:55 )             40.3         136  |  103  |  9   ----------------------------<  96  4.7   |  18<L>  |  0.73    Ca    8.8      29 Aug 2023 09:55    TPro  7.3  /  Alb  4.0  /  TBili  0.5  /  DBili  x   /  AST  26  /  ALT  11  /  AlkPhos  87      I&O's Detail      Urinalysis Basic - ( 29 Aug 2023 09:55 )    Color: x / Appearance: x / SG: x / pH: x  Gluc: 96 mg/dL / Ketone: x  / Bili: x / Urobili: x   Blood: x / Protein: x / Nitrite: x   Leuk Esterase: x / RBC: x / WBC x   Sq Epi: x / Non Sq Epi: x / Bacteria: x        RADIOLOGY & ADDITIONAL STUDIES:    TVUS()  FINDINGS:  Uterus: 9.3 cm x 5.2 cm x 7.0 cm. Posterior intramural fibroid measuring   2.4 x 2.3 x 2.2 cm. Left-sided intramural fibroid measuring 4.4 x 3.4 x   3.7 cm with internal cystic degeneration. Right sided intramural fibroid   measuring 1.8 x 1.7 x 1.7 cm.  Endometrium: 3 mm. No intrauterine gestational sac is visualized.    Right ovary: 3.7 cm x 1.6 cm x 2.7 cm. Within normal limits.  Left ovary: 3.0 cm x 1.7 cm x 1.6 cm. 1.3 x 1.3 x 1.0 cm corpus luteum.    Fluid: Small amount of pelvic fluid.    IMPRESSION:  Pregnancy of unknown location. Differential diagnosis includes early IUP,   pregnancy failure, or nonvisualized ectopic pregnancy. Recommend   follow-up with serial hCG and pelvic ultrasound.    Multiple uterine fibroids with left-sided fibroid with cystic   degeneration.   SARAH MERCADO  28y  Female [id number]    HPI: 29yo  LMP 7/2 @8w2d presenting with vaginal bleeding. She had a positive pregnancy test at home last Wednesday. She started having mild abdominal cramping a few days ago. Today she started to have light vaginal bleeding. She put on her first pad right before coming to the ED and it is not fully saturated. She denies passing any tissue. She denies dizziness, lightheadedness, chest pain, SOB, nausea, vomiting.         Name of GYN Physician: [facility] on [facility]    POB:   x1, TOP s/p D&C x1    Pgyn: +hx of fibroids. Denies cysts, STI's, Abnormal pap smears     Home meds: none    Allergies    No Known Drug Allergies  spices (Swelling)      PAST MEDICAL & SURGICAL HISTORY:  HLD    D&C for TOP    Removal of retained piece of IUD Dec 2022          Social History:  Denies smoking use. +social alcohol use. + social marijuana use        Vital Signs Last 24 Hrs  T(C): 37.1 (29 Aug 2023 09:10), Max: 37.1 (29 Aug 2023 09:10)  T(F): 98.7 (29 Aug 2023 09:10), Max: 98.7 (29 Aug 2023 09:10)  HR: 77 (29 Aug 2023 09:10) (77 - 77)  BP: 140/95 (29 Aug 2023 09:10) (140/95 - 140/95)  BP(mean): --  RR: 16 (29 Aug 2023 09:10) (16 - 16)  SpO2: 99% (29 Aug 2023 09:10) (99% - 99%)    Parameters below as of 29 Aug 2023 09:10  Patient On (Oxygen Delivery Method): room air        Physical Exam:   General: sitting comftorably in bed, NAD   Abd: Soft, non-tender, non-distended.  :  Pad 30% saturated.   External labia wnl.  Bimanual exam with no cervical motion tenderness, 8wk size bulky uterus, R sided adnexal tenderness to palpation, adnexa non palpable b/l.  Cervix closed   Speculum Exam: 10cc blood in vaginal vault No active bleeding from os.        LABS:                        13.3   3.55  )-----------( 187      ( 29 Aug 2023 09:55 )             40.3         136  |  103  |  9   ----------------------------<  96  4.7   |  18<L>  |  0.73    Ca    8.8      29 Aug 2023 09:55    TPro  7.3  /  Alb  4.0  /  TBili  0.5  /  DBili  x   /  AST  26  /  ALT  11  /  AlkPhos  87      I&O's Detail      Urinalysis Basic - ( 29 Aug 2023 09:55 )    Color: x / Appearance: x / SG: x / pH: x  Gluc: 96 mg/dL / Ketone: x  / Bili: x / Urobili: x   Blood: x / Protein: x / Nitrite: x   Leuk Esterase: x / RBC: x / WBC x   Sq Epi: x / Non Sq Epi: x / Bacteria: x        RADIOLOGY & ADDITIONAL STUDIES:    TVUS()  FINDINGS:  Uterus: 9.3 cm x 5.2 cm x 7.0 cm. Posterior intramural fibroid measuring   2.4 x 2.3 x 2.2 cm. Left-sided intramural fibroid measuring 4.4 x 3.4 x   3.7 cm with internal cystic degeneration. Right sided intramural fibroid   measuring 1.8 x 1.7 x 1.7 cm.  Endometrium: 3 mm. No intrauterine gestational sac is visualized.    Right ovary: 3.7 cm x 1.6 cm x 2.7 cm. Within normal limits.  Left ovary: 3.0 cm x 1.7 cm x 1.6 cm. 1.3 x 1.3 x 1.0 cm corpus luteum.    Fluid: Small amount of pelvic fluid.    IMPRESSION:  Pregnancy of unknown location. Differential diagnosis includes early IUP,   pregnancy failure, or nonvisualized ectopic pregnancy. Recommend   follow-up with serial hCG and pelvic ultrasound.    Multiple uterine fibroids with left-sided fibroid with cystic   degeneration.

## 2023-08-29 NOTE — CONSULT NOTE ADULT - ASSESSMENT
29yo  LMP  @8w2d presenting with vaginal bleeding. Patient with stable vital signs and benign abdominal exam. bHCG 44.6. TVUS showing no IUP, no adnexal masses, no free fluid, 4cm degenerating fibroid. Likely spontaneous , though differential also includes ectopic pregnancy and early viable IUP.  Difficult to assess at this time.      - Patient to follow up in 48 hours for repeat b-HCG in clinic  - Rh type: O+.    No need for rhogam at this time.  - Ectopic precautions reviewed with patient, including severe abdominal pain not relieved with medications, nausea, vomiting, dizziness, lightheadedness, heavy vaginal bleeding. Discussed with patient importance of follow up for b-HCG given unknown pregnancy location at this time.  Patient expressed understanding.  All questions and concerns addressed to patient's apparent satisfaction.   - Patient to be added to GYN b-HCG list for closer follow up.    - Patient stable for d/c home from GYN perspective.  Primary management per ED team.        Heather Quinonez, PGY2  d/w Dr. Salgado 27yo  LMP  @8w2d presenting with vaginal bleeding. Patient with stable vital signs and benign abdominal exam. bHCG 44.6. TVUS showing no IUP, no adnexal masses, no free fluid, 4cm degenerating fibroid. Likely spontaneous , though differential also includes ectopic pregnancy and early viable IUP.  Difficult to assess at this time.      - Patient to follow up in 48 hours for repeat b-HCG in clinic  - Rh type: O+.    No need for rhogam at this time.  - Ectopic precautions reviewed with patient, including severe abdominal pain not relieved with medications, nausea, vomiting, dizziness, lightheadedness, heavy vaginal bleeding. Discussed with patient importance of follow up for b-HCG given unknown pregnancy location at this time.  Patient expressed understanding.  All questions and concerns addressed to patient's apparent satisfaction.   - Patient to be added to GYN b-HCG list for closer follow up.    - Patient stable for d/c home from GYN perspective.  Primary management per ED team.        Heather Quinonez, PGY2  d/w Dr. Salgado

## 2023-08-29 NOTE — ED PROVIDER NOTE - OBJECTIVE STATEMENT
29 y/o female with +ucg at home this week LMP 7/2/23 OBGYN at [facility] no confirmed IUP presents to ED c/o pelvic cramping and vaginal bleeding x 2 days. Pt states she hasn't used a pad yet but feels heavy like a period. Denies clots. States had painful cramping last night better today. No cp, sob, palpitations, n/v, dysuria. 29 y/o female with +ucg at home this week LMP 7/2/23 OBGYN at [facility] no confirmed IUP presents to ED c/o pelvic cramping and vaginal bleeding x 2 days. Pt states she hasn't used a pad yet but feels heavy like a period. Denies clots. States had painful cramping last night better today. No cp, sob, palpitations, n/v, dysuria. 27 y/o female with +ucg at home this week LMP 7/2/23 OBGYN at [facility] no confirmed IUP presents to ED c/o pelvic cramping and vaginal bleeding x 2 days. Pt states she hasn't used a pad yet but feels heavy like a period. Denies clots. States had painful cramping last night better today. No cp, sob, palpitations, n/v, dysuria.

## 2023-08-30 PROBLEM — Z00.00 ENCOUNTER FOR PREVENTIVE HEALTH EXAMINATION: Status: ACTIVE | Noted: 2023-08-30

## 2023-08-30 LAB
CULTURE RESULTS: SIGNIFICANT CHANGE UP
SPECIMEN SOURCE: SIGNIFICANT CHANGE UP

## 2023-08-31 ENCOUNTER — OUTPATIENT (OUTPATIENT)
Dept: OUTPATIENT SERVICES | Facility: HOSPITAL | Age: 28
LOS: 1 days | End: 2023-08-31

## 2023-08-31 ENCOUNTER — RESULT REVIEW (OUTPATIENT)
Age: 28
End: 2023-08-31

## 2023-08-31 ENCOUNTER — APPOINTMENT (OUTPATIENT)
Dept: OBGYN | Facility: HOSPITAL | Age: 28
End: 2023-08-31
Payer: MEDICAID

## 2023-08-31 VITALS
HEIGHT: 65 IN | TEMPERATURE: 97.6 F | DIASTOLIC BLOOD PRESSURE: 89 MMHG | SYSTOLIC BLOOD PRESSURE: 125 MMHG | WEIGHT: 136 LBS | HEART RATE: 64 BPM | BODY MASS INDEX: 22.66 KG/M2

## 2023-08-31 LAB
HCG SERPL-ACNC: 38 MIU/ML — SIGNIFICANT CHANGE UP

## 2023-08-31 PROCEDURE — 99212 OFFICE O/P EST SF 10 MIN: CPT | Mod: TH,GE

## 2023-08-31 NOTE — PHYSICAL EXAM
[Chaperone Present] : A chaperone was present in the examining room during all aspects of the physical examination [Soft] : soft [Non-tender] : non-tender [Non-distended] : non-distended [Oriented x3] : oriented x3 [Depressed Mood] : depressed mood

## 2023-09-01 NOTE — HISTORY OF PRESENT ILLNESS
[FreeTextEntry1] : 27 y/o  LMP 2023 8w4d presenting to clinic for repeat bHCG. She initially presented to the ED on  w/ vaginal spotting and abdominal pain and bHCG at that time was 44.6 w/ TVUS showing no IUP, endometrium measuring 3mm, RO wnl and LO w/ CL.   bHCG is 38.1. Patient states she is having some vaginal bleeding and has passed a few jorge alberto-sized clots today. She is having 3/10 abdominal cramping. She does not think she has passed any tissue. Patient is visibly upset as this was a desired pregnancy.    Bedside limited TVUS was performed today. No IUP and Endometrial lining 2.5mm.

## 2023-09-01 NOTE — DISCUSSION/SUMMARY
[FreeTextEntry1] : 29 y/o  presenting for repeat bHCG for PUL. - bHCG downtrendin.6()->38.1() - pt to f/u in ED in 48 hours on  for repeat bHCG   Discussed w/ Dr. Max Alarcon PGY-1

## 2023-09-02 ENCOUNTER — EMERGENCY (EMERGENCY)
Facility: HOSPITAL | Age: 28
LOS: 1 days | Discharge: HOME | End: 2023-09-02
Admitting: STUDENT IN AN ORGANIZED HEALTH CARE EDUCATION/TRAINING PROGRAM
Payer: MEDICAID

## 2023-09-02 VITALS
SYSTOLIC BLOOD PRESSURE: 114 MMHG | RESPIRATION RATE: 18 BRPM | HEART RATE: 72 BPM | DIASTOLIC BLOOD PRESSURE: 72 MMHG | OXYGEN SATURATION: 100 % | TEMPERATURE: 99 F

## 2023-09-02 LAB
ALBUMIN SERPL ELPH-MCNC: 4.4 G/DL — SIGNIFICANT CHANGE UP (ref 3.3–5)
ALP SERPL-CCNC: 102 U/L — SIGNIFICANT CHANGE UP (ref 40–120)
ALT FLD-CCNC: 8 U/L — SIGNIFICANT CHANGE UP (ref 4–33)
ANION GAP SERPL CALC-SCNC: 11 MMOL/L — SIGNIFICANT CHANGE UP (ref 7–14)
AST SERPL-CCNC: 11 U/L — SIGNIFICANT CHANGE UP (ref 4–32)
BASOPHILS # BLD AUTO: 0.02 K/UL — SIGNIFICANT CHANGE UP (ref 0–0.2)
BASOPHILS NFR BLD AUTO: 0.5 % — SIGNIFICANT CHANGE UP (ref 0–2)
BILIRUB SERPL-MCNC: 0.2 MG/DL — SIGNIFICANT CHANGE UP (ref 0.2–1.2)
BUN SERPL-MCNC: 8 MG/DL — SIGNIFICANT CHANGE UP (ref 7–23)
CALCIUM SERPL-MCNC: 9.2 MG/DL — SIGNIFICANT CHANGE UP (ref 8.4–10.5)
CHLORIDE SERPL-SCNC: 107 MMOL/L — SIGNIFICANT CHANGE UP (ref 98–107)
CO2 SERPL-SCNC: 23 MMOL/L — SIGNIFICANT CHANGE UP (ref 22–31)
CREAT SERPL-MCNC: 0.73 MG/DL — SIGNIFICANT CHANGE UP (ref 0.5–1.3)
EGFR: 113 ML/MIN/1.73M2 — SIGNIFICANT CHANGE UP
EOSINOPHIL # BLD AUTO: 0.09 K/UL — SIGNIFICANT CHANGE UP (ref 0–0.5)
EOSINOPHIL NFR BLD AUTO: 2.3 % — SIGNIFICANT CHANGE UP (ref 0–6)
GLUCOSE SERPL-MCNC: 80 MG/DL — SIGNIFICANT CHANGE UP (ref 70–99)
HCG SERPL-ACNC: 28.3 MIU/ML — SIGNIFICANT CHANGE UP
HCT VFR BLD CALC: 41.3 % — SIGNIFICANT CHANGE UP (ref 34.5–45)
HGB BLD-MCNC: 13.2 G/DL — SIGNIFICANT CHANGE UP (ref 11.5–15.5)
IANC: 2.48 K/UL — SIGNIFICANT CHANGE UP (ref 1.8–7.4)
IMM GRANULOCYTES NFR BLD AUTO: 0.3 % — SIGNIFICANT CHANGE UP (ref 0–0.9)
LYMPHOCYTES # BLD AUTO: 1.01 K/UL — SIGNIFICANT CHANGE UP (ref 1–3.3)
LYMPHOCYTES # BLD AUTO: 25.8 % — SIGNIFICANT CHANGE UP (ref 13–44)
MCHC RBC-ENTMCNC: 29.3 PG — SIGNIFICANT CHANGE UP (ref 27–34)
MCHC RBC-ENTMCNC: 32 GM/DL — SIGNIFICANT CHANGE UP (ref 32–36)
MCV RBC AUTO: 91.6 FL — SIGNIFICANT CHANGE UP (ref 80–100)
MONOCYTES # BLD AUTO: 0.31 K/UL — SIGNIFICANT CHANGE UP (ref 0–0.9)
MONOCYTES NFR BLD AUTO: 7.9 % — SIGNIFICANT CHANGE UP (ref 2–14)
NEUTROPHILS # BLD AUTO: 2.48 K/UL — SIGNIFICANT CHANGE UP (ref 1.8–7.4)
NEUTROPHILS NFR BLD AUTO: 63.2 % — SIGNIFICANT CHANGE UP (ref 43–77)
NRBC # BLD: 0 /100 WBCS — SIGNIFICANT CHANGE UP (ref 0–0)
NRBC # FLD: 0 K/UL — SIGNIFICANT CHANGE UP (ref 0–0)
PLATELET # BLD AUTO: 188 K/UL — SIGNIFICANT CHANGE UP (ref 150–400)
POTASSIUM SERPL-MCNC: 4.1 MMOL/L — SIGNIFICANT CHANGE UP (ref 3.5–5.3)
POTASSIUM SERPL-SCNC: 4.1 MMOL/L — SIGNIFICANT CHANGE UP (ref 3.5–5.3)
PROT SERPL-MCNC: 7.4 G/DL — SIGNIFICANT CHANGE UP (ref 6–8.3)
RBC # BLD: 4.51 M/UL — SIGNIFICANT CHANGE UP (ref 3.8–5.2)
RBC # FLD: 12.5 % — SIGNIFICANT CHANGE UP (ref 10.3–14.5)
SODIUM SERPL-SCNC: 141 MMOL/L — SIGNIFICANT CHANGE UP (ref 135–145)
WBC # BLD: 3.92 K/UL — SIGNIFICANT CHANGE UP (ref 3.8–10.5)
WBC # FLD AUTO: 3.92 K/UL — SIGNIFICANT CHANGE UP (ref 3.8–10.5)

## 2023-09-02 PROCEDURE — 99285 EMERGENCY DEPT VISIT HI MDM: CPT

## 2023-09-02 NOTE — CONSULT NOTE ADULT - SUBJECTIVE AND OBJECTIVE BOX
Gyn Consult Note  SARAH MERCADO  28y  Female [id number]    HPI: Pt is a 28y  LMP  at 8w5d gestation by LMP presenting for repeat bHCG in setting of pregnancy of unknown location. Pt last seen on . Pt reports midline abdominal cramping earlier this morning that resolved on its own. Pt denies vaginal bleeding for the last 1-2 days. Denies n/v, fevers, chills, dizziness/lightheadedness, chest pain, SOB. Denies difficulty with urinating or bowel movements.    [facility] [address]  POB:   x1, TOP s/p D&C x1  Pgyn: +hx of fibroids. Denies cysts, STI's, Abnormal pap smears   PMH: HLD  PSH: D&C x1, removal of retained IUD 2022  Home meds: none  NKDA  Social History:  Denies smoking use. +social alcohol use. + social marijuana use    Vital Signs Last 24 Hrs  T(C): 37.3 (02 Sep 2023 15:21), Max: 37.3 (02 Sep 2023 15:21)  T(F): 99.1 (02 Sep 2023 15:21), Max: 99.1 (02 Sep 2023 15:21)  HR: 72 (02 Sep 2023 15:21) (72 - 72)  BP: 114/72 (02 Sep 2023 15:21) (114/72 - 114/72)  BP(mean): --  RR: 18 (02 Sep 2023 15:21) (18 - 18)  SpO2: 100% (02 Sep 2023 15:21) (100% - 100%)    Parameters below as of 02 Sep 2023 15:21  Patient On (Oxygen Delivery Method): room air    Physical Exam:   General: sitting comfortably in bed, NAD   CV: RRR  Lungs: CTAB  Abd: Soft, non-tender, non-distended.  Bowel sounds present.    : No bleeding on pad. External labia wnl. Uterus wnl, nontender. Adnexa non palpable b/l. No CMT. Cervix closed.   Speculum Exam: No active bleeding from os.  Physiologic discharge.    Ext: non-tender b/l, no edema     LABS:                              13.2   3.92  )-----------( 188      ( 02 Sep 2023 16:25 )             41.3     09-    141  |  107  |  8   ----------------------------<  80  4.1   |  23  |  0.73    Ca    9.2      02 Sep 2023 16:25    TPro  7.4  /  Alb  4.4  /  TBili  0.2  /  DBili  x   /  AST  11  /  ALT  8   /  AlkPhos  102  09-      Urinalysis Basic - ( 02 Sep 2023 16:25 )    Color: x / Appearance: x / SG: x / pH: x  Gluc: 80 mg/dL / Ketone: x  / Bili: x / Urobili: x   Blood: x / Protein: x / Nitrite: x   Leuk Esterase: x / RBC: x / WBC x   Sq Epi: x / Non Sq Epi: x / Bacteria: x        RADIOLOGY & ADDITIONAL STUDIES:  < from: US Transvaginal (23 @ 10:53) >  FINDINGS:  Uterus: 9.3 cm x 5.2 cm x 7.0 cm. Posterior intramural fibroid measuring   2.4 x 2.3 x 2.2 cm. Left-sided intramural fibroid measuring 4.4 x 3.4 x   3.7 cm with internal cystic degeneration. Right sided intramural fibroid   measuring 1.8 x 1.7 x 1.7 cm.  Endometrium: 3 mm. No intrauterine gestational sac is visualized.    Right ovary: 3.7 cmx 1.6 cm x 2.7 cm. Within normal limits.  Left ovary: 3.0 cm x 1.7 cm x 1.6 cm. 1.3 x 1.3 x 1.0 cm corpus luteum.    Fluid: Small amount of pelvic fluid.    IMPRESSION:  Pregnancy of unknown location. Differential diagnosis includes early IUP,   pregnancy failure, or nonvisualized ectopic pregnancy. Recommend   follow-up with serial hCG and pelvic ultrasound.    Multiple uterine fibroids with left-sided fibroid with cystic   degeneration.    --- End of Report ---      < end of copied text >   Gyn Consult Note  SARAH MERCADO  28y  Female [id number]    HPI: Pt is a 28y  LMP  at 8w5d gestation by LMP presenting for repeat bHCG in setting of pregnancy of unknown location. Pt last seen on . Pt reports midline abdominal cramping earlier this morning that resolved on its own. Pt denies vaginal bleeding for the last 1-2 days. Denies n/v, fevers, chills, dizziness/lightheadedness, chest pain, SOB. Denies difficulty with urinating or bowel movements.    Name of GYN Physician: [facility] on [address]  POB:   x1, TOP s/p D&C x1  Pgyn: +hx of fibroids. Denies cysts, STI's, Abnormal pap smears   PMH: HLD  PSH: D&C x1, removal of retained IUD 2022  Home meds: none  NKDA  Social History:  Denies smoking use. +social alcohol use. + social marijuana use    Vital Signs Last 24 Hrs  T(C): 37.3 (02 Sep 2023 15:21), Max: 37.3 (02 Sep 2023 15:21)  T(F): 99.1 (02 Sep 2023 15:21), Max: 99.1 (02 Sep 2023 15:21)  HR: 72 (02 Sep 2023 15:21) (72 - 72)  BP: 114/72 (02 Sep 2023 15:21) (114/72 - 114/72)  BP(mean): --  RR: 18 (02 Sep 2023 15:21) (18 - 18)  SpO2: 100% (02 Sep 2023 15:21) (100% - 100%)    Parameters below as of 02 Sep 2023 15:21  Patient On (Oxygen Delivery Method): room air    Physical Exam:   General: sitting comfortably in bed, NAD   CV: RRR  Lungs: CTAB  Abd: Soft, non-tender, non-distended.  Bowel sounds present.    : No bleeding on pad. External labia wnl. Uterus wnl, nontender. Adnexa non palpable b/l. No CMT. Cervix closed.   Speculum Exam: No active bleeding from os.  Physiologic discharge.    Ext: non-tender b/l, no edema     LABS:                              13.2   3.92  )-----------( 188      ( 02 Sep 2023 16:25 )             41.3     09-    141  |  107  |  8   ----------------------------<  80  4.1   |  23  |  0.73    Ca    9.2      02 Sep 2023 16:25    TPro  7.4  /  Alb  4.4  /  TBili  0.2  /  DBili  x   /  AST  11  /  ALT  8   /  AlkPhos  102  09-      Urinalysis Basic - ( 02 Sep 2023 16:25 )    Color: x / Appearance: x / SG: x / pH: x  Gluc: 80 mg/dL / Ketone: x  / Bili: x / Urobili: x   Blood: x / Protein: x / Nitrite: x   Leuk Esterase: x / RBC: x / WBC x   Sq Epi: x / Non Sq Epi: x / Bacteria: x        RADIOLOGY & ADDITIONAL STUDIES:  < from: US Transvaginal (23 @ 10:53) >  FINDINGS:  Uterus: 9.3 cm x 5.2 cm x 7.0 cm. Posterior intramural fibroid measuring   2.4 x 2.3 x 2.2 cm. Left-sided intramural fibroid measuring 4.4 x 3.4 x   3.7 cm with internal cystic degeneration. Right sided intramural fibroid   measuring 1.8 x 1.7 x 1.7 cm.  Endometrium: 3 mm. No intrauterine gestational sac is visualized.    Right ovary: 3.7 cmx 1.6 cm x 2.7 cm. Within normal limits.  Left ovary: 3.0 cm x 1.7 cm x 1.6 cm. 1.3 x 1.3 x 1.0 cm corpus luteum.    Fluid: Small amount of pelvic fluid.    IMPRESSION:  Pregnancy of unknown location. Differential diagnosis includes early IUP,   pregnancy failure, or nonvisualized ectopic pregnancy. Recommend   follow-up with serial hCG and pelvic ultrasound.    Multiple uterine fibroids with left-sided fibroid with cystic   degeneration.    --- End of Report ---      < end of copied text >   Gyn Consult Note  SARAH MERCADO  28y  Female [id number]    HPI: Pt is a 28y  LMP  at 8w5d gestation by LMP presenting for repeat bHCG in setting of pregnancy of unknown location. Pt last seen on . Pt reports midline abdominal cramping earlier this morning that resolved on its own. Pt denies vaginal bleeding for the last 1-2 days. Denies n/v, fevers, chills, dizziness/lightheadedness, chest pain, SOB. Denies difficulty with urinating or bowel movements.    Name of GYN Physician: [facility] on [address]  POB:   x1, TOP s/p D&C x1  Pgyn: +hx of fibroids. Denies cysts, STI's, Abnormal pap smears   PMH: HLD  PSH: D&C x1, removal of retained IUD 2022  Home meds: none  NKDA  Social History:  Denies smoking use. +social alcohol use. + social marijuana use    Vital Signs Last 24 Hrs  T(C): 37.3 (02 Sep 2023 15:21), Max: 37.3 (02 Sep 2023 15:21)  T(F): 99.1 (02 Sep 2023 15:21), Max: 99.1 (02 Sep 2023 15:21)  HR: 72 (02 Sep 2023 15:21) (72 - 72)  BP: 114/72 (02 Sep 2023 15:21) (114/72 - 114/72)  BP(mean): --  RR: 18 (02 Sep 2023 15:21) (18 - 18)  SpO2: 100% (02 Sep 2023 15:21) (100% - 100%)    Parameters below as of 02 Sep 2023 15:21  Patient On (Oxygen Delivery Method): room air    Physical Exam:   General: sitting comfortably in bed, NAD   CV: RRR  Lungs: CTAB  Abd: Soft, non-tender, non-distended.  Bowel sounds present.    : No bleeding on pad. External labia wnl. Uterus wnl, nontender. Adnexa non palpable b/l. No CMT. Cervix closed.   Speculum Exam: No active bleeding from os.  Physiologic discharge.    Ext: non-tender b/l, no edema     LABS:                              13.2   3.92  )-----------( 188      ( 02 Sep 2023 16:25 )             41.3     09-    141  |  107  |  8   ----------------------------<  80  4.1   |  23  |  0.73    Ca    9.2      02 Sep 2023 16:25    TPro  7.4  /  Alb  4.4  /  TBili  0.2  /  DBili  x   /  AST  11  /  ALT  8   /  AlkPhos  102  09-      Urinalysis Basic - ( 02 Sep 2023 16:25 )    Color: x / Appearance: x / SG: x / pH: x  Gluc: 80 mg/dL / Ketone: x  / Bili: x / Urobili: x   Blood: x / Protein: x / Nitrite: x   Leuk Esterase: x / RBC: x / WBC x   Sq Epi: x / Non Sq Epi: x / Bacteria: x        RADIOLOGY & ADDITIONAL STUDIES:  < from: US Transvaginal (23 @ 10:53) >  FINDINGS:  Uterus: 9.3 cm x 5.2 cm x 7.0 cm. Posterior intramural fibroid measuring   2.4 x 2.3 x 2.2 cm. Left-sided intramural fibroid measuring 4.4 x 3.4 x   3.7 cm with internal cystic degeneration. Right sided intramural fibroid   measuring 1.8 x 1.7 x 1.7 cm.  Endometrium: 3 mm. No intrauterine gestational sac is visualized.    Right ovary: 3.7 cmx 1.6 cm x 2.7 cm. Within normal limits.  Left ovary: 3.0 cm x 1.7 cm x 1.6 cm. 1.3 x 1.3 x 1.0 cm corpus luteum.    Fluid: Small amount of pelvic fluid.    IMPRESSION:  Pregnancy of unknown location. Differential diagnosis includes early IUP,   pregnancy failure, or nonvisualized ectopic pregnancy. Recommend   follow-up with serial hCG and pelvic ultrasound.    Multiple uterine fibroids with left-sided fibroid with cystic   degeneration.    --- End of Report ---      < end of copied text >

## 2023-09-02 NOTE — ED ADULT NURSE NOTE - OBJECTIVE STATEMENT
Received pt to intake 4, A+Ox4, ambulatory. arrives to ED for followup HCG testing. pt states she had a miscarriage and needs levels checked. endorsing minor cramping this morning, denies vaginal bleeding. Pt denies any chest pain, SOB, headache, dizziness, N+V, diarrhea, fever, chills.   butterflied for labs, Labs sent, will continue to monitor.

## 2023-09-02 NOTE — ED PROVIDER NOTE - NSFOLLOWUPINSTRUCTIONS_ED_ALL_ED_FT
Follow up with MEGGAN in 1 week for repeat pregnancy hormone testing. Return to ED sooner for fever, abdominal pain, excessive vaginal bleeding (soaking 1 pad/hour), or any signs of distress.

## 2023-09-02 NOTE — ED ADULT TRIAGE NOTE - CHIEF COMPLAINT QUOTE
here for follow up HCG levels, reports vaginal bleeding improved since Monday. Pt states was found to be pregnant but no fetus was visualized on ultrasound at that time.

## 2023-09-02 NOTE — ED PROVIDER NOTE - OBJECTIVE STATEMENT
27 y/o F no PMH  approx 8 weeks GA based on LMP  returns to ED for repeat bhcg. Pt was in ED 4 days ago, dx CAIT on TVUS, consulted by OBGYN. Pt notes she had some vaginal bleeding a few days ago that has resolved, otherwise feels well. Denies fever, chills, CP, SOB, abdominal pain. 29 y/o F no PMH  approx 8 weeks GA based on LMP  returns to ED for repeat bhcg. Pt was in ED 4 days ago, dx CAIT on TVUS, consulted by OBGYN. Pt notes she had some vaginal bleeding a few days ago that has resolved, otherwise feels well. Denies fever, chills, CP, SOB, abdominal pain.

## 2023-09-02 NOTE — ED PROVIDER NOTE - MDM ORDERS SUBMITTED SELECTION
Labs Inflammation Suggestive Of Cancer Camouflage Histology Text: There was a dense lymphocytic infiltrate which prevented adequate histologic evaluation of adjacent structures.

## 2023-09-02 NOTE — ED ADULT NURSE NOTE - NSFALLUNIVINTERV_ED_ALL_ED
Bed/Stretcher in lowest position, wheels locked, appropriate side rails in place/Call bell, personal items and telephone in reach/Instruct patient to call for assistance before getting out of bed/chair/stretcher/Non-slip footwear applied when patient is off stretcher/[place] to call system/Physically safe environment - no spills, clutter or unnecessary equipment/Purposeful proactive rounding/Room/bathroom lighting operational, light cord in reach Bed/Stretcher in lowest position, wheels locked, appropriate side rails in place/Call bell, personal items and telephone in reach/Instruct patient to call for assistance before getting out of bed/chair/stretcher/Non-slip footwear applied when patient is off stretcher/[place] to call system/Physically safe environment - no spills, clutter or unnecessary equipment/Purposeful proactive rounding/Room/bathroom lighting operational, light cord in reach Bed/Stretcher in lowest position, wheels locked, appropriate side rails in place/Call bell, personal items and telephone in reach/Instruct patient to call for assistance before getting out of bed/chair/stretcher/Non-slip footwear applied when patient is off stretcher/[place] to call system/Physically safe environment - no spills, clutter or unnecessary equipment/Purposeful proactive rounding/Room/bathroom lighting operational, light cord in reach

## 2023-09-02 NOTE — CONSULT NOTE ADULT - ASSESSMENT
A/P:  Pt is a 28y  LMP  at 8w5d gestation by LMP presenting for repeat bHCG in setting of pregnancy of unknown location. Pt initially presented on  with VB and abdominal pain. bHC.6()->38.0()->28.3(). Pt with downtrending bHCG likely consistent with SAB. Pt asymptomatic at today's ED visit and hemodynamically stable, non-tender on exam.    -patient to follow up in 1w for repeat bHCG in ED  -Rh type: O+. No need for rhogam at this time.   -Ectopic precautions reviewed with patient.  Discussed with patient importance of follow up for b-HCG given unknown pregnancy location at this time.  Patient expressed understanding.  All questions and concerns addressed to patient's apparent satisfaction.   -patient remains on GYN b-HCG list for closer follow up.    -patient stable for d/c home from GYN perspective.  Primary managment per ED team.      D/w Dr. Ronan Mendez PGY2

## 2023-09-02 NOTE — ED PROVIDER NOTE - PATIENT PORTAL LINK FT
You can access the FollowMyHealth Patient Portal offered by [facility] by registering at the following website: [email or web address]. By joining [employer]’s FollowMyHealth portal, you will also be able to view your health information using other applications (apps) compatible with our system. You can access the FollowMyHealth Patient Portal offered by [facility] by registering at the following website: [email or web address]. By joining [employer]s FollowMyHealth portal, you will also be able to view your health information using other applications (apps) compatible with our system. You can access the FollowMyHealth Patient Portal offered by [facility] by registering at the following website: [email or web address]. By joining [employer]’s FollowMyHealth portal, you will also be able to view your health information using other applications (apps) compatible with our system.

## 2023-09-06 DIAGNOSIS — O36.80X0 PREGNANCY WITH INCONCLUSIVE FETAL VIABILITY, NOT APPLICABLE OR UNSPECIFIED: ICD-10-CM

## 2023-09-08 NOTE — CHART NOTE - NSCHARTNOTESELECT_GEN_ALL_CORE
[facility] [place] [facility] [place] [facility] [place]

## 2023-09-19 ENCOUNTER — APPOINTMENT (OUTPATIENT)
Dept: OBGYN | Facility: CLINIC | Age: 28
End: 2023-09-19

## 2023-10-02 NOTE — CHART NOTE - NSCHARTNOTEFT_GEN_A_CORE
Pt counseled regarding trending bHCG for VB, abdominal pain, and +bHCG. Voicemails left and certified letter sent regarding importance of following bHCG. Pt to come off GYN bHCG f/u list as per Dr. Merino.    Kiki Wilkinson PGY-1
 LMP  initially presented to ED on  with vaginal bleeding and abdominal pain. TVUS at that time showed no intrauterine pregnancy, no suspicious adnexal masses, and small free fluid. Beta HCG has been downtrending 44.6()->38.0()->28.3 (). Likely SAB but cannot rule out ectopic pregnancy. Patient was instructed to follow up on  in the ED for repeat beta HCG. Called patient today to remind her to come to the ED tomorrow. Patient did not answer. Left voicemail with reminder to come to the ED for bloodwork tomorrow.     Heather Quinonez, PGY2

## 2024-01-07 NOTE — REASON FOR VISIT
[Follow-Up] : a follow-up evaluation of
Pt presented with weakness to the left side of the face. Prior to that pt was feeling dizzy. Also noted she developed weakness of the left lower extremity two week ago. No headache. After stroke code in ER pt placed into observation for MRI. On exam this pt has slight flattening of the left nasolabial fold. left lower extremity weakness. S1S2 rrr, lungs clear. Also tested positive for COVID.

## 2024-07-17 ENCOUNTER — EMERGENCY (EMERGENCY)
Facility: HOSPITAL | Age: 29
LOS: 0 days | Discharge: ANOTHER HOSPITAL | End: 2024-07-17
Attending: EMERGENCY MEDICINE
Payer: OTHER GOVERNMENT

## 2024-07-17 ENCOUNTER — INPATIENT (INPATIENT)
Facility: HOSPITAL | Age: 29
LOS: 0 days | Discharge: HOME | End: 2024-07-17
Attending: SPECIALIST | Admitting: SPECIALIST

## 2024-07-17 VITALS
SYSTOLIC BLOOD PRESSURE: 115 MMHG | RESPIRATION RATE: 16 BRPM | DIASTOLIC BLOOD PRESSURE: 67 MMHG | HEART RATE: 80 BPM | TEMPERATURE: 98 F

## 2024-07-17 VITALS — SYSTOLIC BLOOD PRESSURE: 124 MMHG | DIASTOLIC BLOOD PRESSURE: 75 MMHG | HEART RATE: 62 BPM

## 2024-07-17 VITALS
OXYGEN SATURATION: 100 % | HEART RATE: 67 BPM | RESPIRATION RATE: 18 BRPM | DIASTOLIC BLOOD PRESSURE: 66 MMHG | TEMPERATURE: 98 F | SYSTOLIC BLOOD PRESSURE: 105 MMHG

## 2024-07-17 VITALS
HEART RATE: 99 BPM | OXYGEN SATURATION: 99 % | RESPIRATION RATE: 17 BRPM | SYSTOLIC BLOOD PRESSURE: 105 MMHG | TEMPERATURE: 98 F | DIASTOLIC BLOOD PRESSURE: 65 MMHG | WEIGHT: 141.1 LBS | HEIGHT: 65 IN

## 2024-07-17 DIAGNOSIS — R11.0 NAUSEA: ICD-10-CM

## 2024-07-17 DIAGNOSIS — Z91.013 ALLERGY TO SEAFOOD: ICD-10-CM

## 2024-07-17 DIAGNOSIS — Z91.018 ALLERGY TO OTHER FOODS: ICD-10-CM

## 2024-07-17 DIAGNOSIS — O99.891 OTHER SPECIFIED DISEASES AND CONDITIONS COMPLICATING PREGNANCY: ICD-10-CM

## 2024-07-17 DIAGNOSIS — R10.30 LOWER ABDOMINAL PAIN, UNSPECIFIED: ICD-10-CM

## 2024-07-17 DIAGNOSIS — Z3A.23 23 WEEKS GESTATION OF PREGNANCY: ICD-10-CM

## 2024-07-17 DIAGNOSIS — R30.0 DYSURIA: ICD-10-CM

## 2024-07-17 DIAGNOSIS — O60.00 PRETERM LABOR WITHOUT DELIVERY, UNSPECIFIED TRIMESTER: ICD-10-CM

## 2024-07-17 LAB
ALBUMIN SERPL ELPH-MCNC: 2.4 G/DL — LOW (ref 3.3–5)
ALP SERPL-CCNC: 107 U/L — SIGNIFICANT CHANGE UP (ref 40–120)
ALT FLD-CCNC: 12 U/L — SIGNIFICANT CHANGE UP (ref 12–78)
ANION GAP SERPL CALC-SCNC: 5 MMOL/L — SIGNIFICANT CHANGE UP (ref 5–17)
APPEARANCE UR: ABNORMAL
APTT BLD: 33.3 SEC — SIGNIFICANT CHANGE UP (ref 24.5–35.6)
AST SERPL-CCNC: 12 U/L — LOW (ref 15–37)
BASOPHILS # BLD AUTO: 0.02 K/UL — SIGNIFICANT CHANGE UP (ref 0–0.2)
BASOPHILS NFR BLD AUTO: 0.3 % — SIGNIFICANT CHANGE UP (ref 0–2)
BILIRUB SERPL-MCNC: 0.4 MG/DL — SIGNIFICANT CHANGE UP (ref 0.2–1.2)
BILIRUB UR-MCNC: ABNORMAL
BUN SERPL-MCNC: 7 MG/DL — SIGNIFICANT CHANGE UP (ref 7–23)
CALCIUM SERPL-MCNC: 9 MG/DL — SIGNIFICANT CHANGE UP (ref 8.5–10.1)
CHLORIDE SERPL-SCNC: 106 MMOL/L — SIGNIFICANT CHANGE UP (ref 96–108)
CO2 SERPL-SCNC: 26 MMOL/L — SIGNIFICANT CHANGE UP (ref 22–31)
COLOR SPEC: SIGNIFICANT CHANGE UP
CREAT SERPL-MCNC: 0.52 MG/DL — SIGNIFICANT CHANGE UP (ref 0.5–1.3)
DIFF PNL FLD: NEGATIVE — SIGNIFICANT CHANGE UP
EGFR: 127 ML/MIN/1.73M2 — SIGNIFICANT CHANGE UP
EOSINOPHIL # BLD AUTO: 0.12 K/UL — SIGNIFICANT CHANGE UP (ref 0–0.5)
EOSINOPHIL NFR BLD AUTO: 1.7 % — SIGNIFICANT CHANGE UP (ref 0–6)
GLUCOSE SERPL-MCNC: 96 MG/DL — SIGNIFICANT CHANGE UP (ref 70–99)
GLUCOSE UR QL: NEGATIVE MG/DL — SIGNIFICANT CHANGE UP
HCG SERPL-ACNC: HIGH MIU/ML
HCT VFR BLD CALC: 34.2 % — LOW (ref 34.5–45)
HGB BLD-MCNC: 11.4 G/DL — LOW (ref 11.5–15.5)
IMM GRANULOCYTES NFR BLD AUTO: 0.9 % — SIGNIFICANT CHANGE UP (ref 0–0.9)
INR BLD: 0.93 RATIO — SIGNIFICANT CHANGE UP (ref 0.85–1.18)
KETONES UR-MCNC: 15 MG/DL
LEUKOCYTE ESTERASE UR-ACNC: ABNORMAL
LYMPHOCYTES # BLD AUTO: 0.77 K/UL — LOW (ref 1–3.3)
LYMPHOCYTES # BLD AUTO: 11.2 % — LOW (ref 13–44)
MCHC RBC-ENTMCNC: 29.9 PG — SIGNIFICANT CHANGE UP (ref 27–34)
MCHC RBC-ENTMCNC: 33.3 G/DL — SIGNIFICANT CHANGE UP (ref 32–36)
MCV RBC AUTO: 89.8 FL — SIGNIFICANT CHANGE UP (ref 80–100)
MONOCYTES # BLD AUTO: 0.49 K/UL — SIGNIFICANT CHANGE UP (ref 0–0.9)
MONOCYTES NFR BLD AUTO: 7.1 % — SIGNIFICANT CHANGE UP (ref 2–14)
NEUTROPHILS # BLD AUTO: 5.44 K/UL — SIGNIFICANT CHANGE UP (ref 1.8–7.4)
NEUTROPHILS NFR BLD AUTO: 78.8 % — HIGH (ref 43–77)
NITRITE UR-MCNC: POSITIVE
NRBC # BLD: 0 /100 WBCS — SIGNIFICANT CHANGE UP (ref 0–0)
PH UR: 6.5 — SIGNIFICANT CHANGE UP (ref 5–8)
PLATELET # BLD AUTO: 152 K/UL — SIGNIFICANT CHANGE UP (ref 150–400)
POTASSIUM SERPL-MCNC: 3.4 MMOL/L — LOW (ref 3.5–5.3)
POTASSIUM SERPL-SCNC: 3.4 MMOL/L — LOW (ref 3.5–5.3)
PROT SERPL-MCNC: 6.5 GM/DL — SIGNIFICANT CHANGE UP (ref 6–8.3)
PROT UR-MCNC: SIGNIFICANT CHANGE UP MG/DL
PROTHROM AB SERPL-ACNC: 11.2 SEC — SIGNIFICANT CHANGE UP (ref 9.5–13)
RBC # BLD: 3.81 M/UL — SIGNIFICANT CHANGE UP (ref 3.8–5.2)
RBC # FLD: 12.7 % — SIGNIFICANT CHANGE UP (ref 10.3–14.5)
SODIUM SERPL-SCNC: 137 MMOL/L — SIGNIFICANT CHANGE UP (ref 135–145)
SP GR SPEC: 1.02 — SIGNIFICANT CHANGE UP (ref 1–1.03)
UROBILINOGEN FLD QL: 1 MG/DL — SIGNIFICANT CHANGE UP (ref 0.2–1)
WBC # BLD: 6.9 K/UL — SIGNIFICANT CHANGE UP (ref 3.8–10.5)
WBC # FLD AUTO: 6.9 K/UL — SIGNIFICANT CHANGE UP (ref 3.8–10.5)

## 2024-07-17 PROCEDURE — 99285 EMERGENCY DEPT VISIT HI MDM: CPT

## 2024-07-17 RX ORDER — FAMOTIDINE 40 MG
20 TABLET ORAL DAILY
Refills: 0 | Status: DISCONTINUED | OUTPATIENT
Start: 2024-07-17 | End: 2024-07-17

## 2024-07-17 RX ORDER — ACETAMINOPHEN 325 MG
1000 TABLET ORAL ONCE
Refills: 0 | Status: COMPLETED | OUTPATIENT
Start: 2024-07-17 | End: 2024-07-17

## 2024-07-17 RX ORDER — INDOMETHACIN 75 MG/1
50 CAPSULE, EXTENDED RELEASE ORAL ONCE
Refills: 0 | Status: COMPLETED | OUTPATIENT
Start: 2024-07-17 | End: 2024-07-17

## 2024-07-17 RX ORDER — PRENATAL VIT/IRON FUM/FOLIC AC 60 MG-1 MG
0 TABLET ORAL
Refills: 0 | DISCHARGE

## 2024-07-17 RX ORDER — CEFTRIAXONE SODIUM 500 MG
1000 VIAL (EA) INJECTION ONCE
Refills: 0 | Status: COMPLETED | OUTPATIENT
Start: 2024-07-17 | End: 2024-07-17

## 2024-07-17 RX ORDER — INDOMETHACIN 75 MG/1
1 CAPSULE, EXTENDED RELEASE ORAL
Qty: 8 | Refills: 0
Start: 2024-07-17 | End: 2024-07-18

## 2024-07-17 RX ORDER — FAMOTIDINE 40 MG
20 TABLET ORAL ONCE
Refills: 0 | Status: COMPLETED | OUTPATIENT
Start: 2024-07-17 | End: 2024-07-17

## 2024-07-17 RX ADMIN — Medication 100 MILLIGRAM(S): at 15:44

## 2024-07-17 RX ADMIN — INDOMETHACIN 50 MILLIGRAM(S): 75 CAPSULE, EXTENDED RELEASE ORAL at 21:23

## 2024-07-17 RX ADMIN — Medication 400 MILLIGRAM(S): at 14:42

## 2024-07-17 RX ADMIN — Medication 20 MILLIGRAM(S): at 21:26

## 2024-07-17 NOTE — OB PROVIDER TRIAGE NOTE - NSHPPHYSICALEXAM_GEN_ALL_CORE
Assessment reveals VSS, abdomen soft, NT.  No CVA tenderness  Ctx 4-6 on toco  TAS- , posterior placenta, MVP 5.9, variable presentation- saved in asob  SSE- cervix appears closed  CL 4.3 no funneling or dynamic changes    Labs sent from [place] stream as follows:  Urinalysis Basic - ( 2024 14:31 )    Color: Dark Yellow / Appearance: Cloudy / S.022 / pH: x  Gluc: 96 mg/dL / Ketone: 15 mg/dL  / Bili: Small / Urobili: 1.0 mg/dL   Blood: x / Protein: Trace mg/dL / Nitrite: Positive   Leuk Esterase: Moderate / RBC: 4 /HPF / WBC 50 /HPF   Sq Epi: x / Non Sq Epi: x / Bacteria: Moderate /HPF                          11.4   6.90  )-----------( 152      ( 2024 14:31 )             34.2    Indocin 50mg PO x 1 Assessment reveals VSS, abdomen soft, NT.  No CVA tenderness  Ctx 4-6 on toco  TAS- , posterior placenta, MVP 5.9, variable presentation- saved in asob  SSE- cervix appears closed  CL 4.3 no funneling or dynamic changes    Labs sent from [place] stream as follows:  Urinalysis Basic - ( 2024 14:31 )    Color: Dark Yellow / Appearance: Cloudy / S.022 / pH: x  Gluc: 96 mg/dL / Ketone: 15 mg/dL  / Bili: Small / Urobili: 1.0 mg/dL   Blood: x / Protein: Trace mg/dL / Nitrite: Positive   Leuk Esterase: Moderate / RBC: 4 /HPF / WBC 50 /HPF   Sq Epi: x / Non Sq Epi: x / Bacteria: Moderate /HPF                          11.4   6.90  )-----------( 152      ( 2024 14:31 )             34.2    Indocin 50mg PO x 1    : Reports some relief of abdominal pain  D/W Dr. Marshall, to come over and evaluate patient. Assessment reveals VSS, abdomen soft, NT.  No CVA tenderness  Ctx 4-6 on toco  TAS- , posterior placenta, MVP 5.9, variable presentation- saved in asob  SSE- cervix appears closed  CL 4.3 no funneling or dynamic changes    Labs sent from [place] stream as follows:  Urinalysis Basic - ( 2024 14:31 )    Color: Dark Yellow / Appearance: Cloudy / S.022 / pH: x  Gluc: 96 mg/dL / Ketone: 15 mg/dL  / Bili: Small / Urobili: 1.0 mg/dL   Blood: x / Protein: Trace mg/dL / Nitrite: Positive   Leuk Esterase: Moderate / RBC: 4 /HPF / WBC 50 /HPF   Sq Epi: x / Non Sq Epi: x / Bacteria: Moderate /HPF                          11.4   6.90  )-----------( 152      ( 2024 14:31 )             34.2    Indocin 50mg PO x 1    0: Reports some relief of abdominal pain  D/W Dr. Marshall, to come over and evaluate patient.    1100: Patient seen and evaluated by Dr. Marshall, US performed.   Moises reports pain scale now 4/10    Reports she was given Rx from [facility] for abx for UTI. Assessment reveals VSS, abdomen soft, NT.  No CVA tenderness  Ctx 4-6 on toco  TAS- , posterior placenta, MVP 5.9, variable presentation- saved in asob  SSE- cervix appears closed  CL 4.3 no funneling or dynamic changes    Labs sent from [place] as follows:  Urinalysis Basic - ( 2024 14:31 )    Color: Dark Yellow / Appearance: Cloudy / S.022 / pH: x  Gluc: 96 mg/dL / Ketone: 15 mg/dL  / Bili: Small / Urobili: 1.0 mg/dL   Blood: x / Protein: Trace mg/dL / Nitrite: Positive   Leuk Esterase: Moderate / RBC: 4 /HPF / WBC 50 /HPF   Sq Epi: x / Non Sq Epi: x / Bacteria: Moderate /HPF                          11.4   6.90  )-----------( 152      ( 2024 14:31 )             34.2    Indocin 50mg PO x 1    0: Reports some relief of abdominal pain  D/W Dr. Marshall, to come over and evaluate patient.    1100: Patient seen and evaluated by Dr. Marshall, US performed.   Moises reports pain scale now 4/10    Reports she was given Rx from [place] for abx for UTI.

## 2024-07-17 NOTE — OB PROVIDER TRIAGE NOTE - NSOBPROVIDERNOTE_OBGYN_ALL_OB_FT
Plan D/W Dr. Marshall, urinary tract infection.   Fibroid Uterus  No evidence of PTL at this time. Plan D/W Dr. Marshall, urinary tract infection.   Fibroid Uterus  No evidence of PTL at this time.    Rx for indocin 25 mg given Q6 for 48 hours  Return for worsening of symptoms or fever  Increase fluid intake  Call Clinic when moving [facility] to [place] to make appointment. [facility] [phone]

## 2024-07-17 NOTE — OB PROVIDER TRIAGE NOTE - NS_OBGYNHISTORY_OBGYN_ALL_OB_FT
SAB complete   10/25/2015    Ap course complicated by multiple large fibroids SAB complete   10/25/2015    Ap course complicated by multiple large fibroids  Treated for UTI in May

## 2024-07-17 NOTE — CHART NOTE - NSCHARTNOTEFT_GEN_A_CORE
Pt seen and evaluated at bedside. Bedside TAUS with firboids noted. R lateral 3cm fibroid, L lateral/NOE 7cm fibroid and L lateral 4cm fibroid. Fetus noted to be moving and with adequate fluid (MVP 6.29). Posterior placenta. Pt without pain from ultrasound probe, however she was noted to have suprapubic tenderness on exam. Pt without CVA tenderness. Pt reports that her pain has improved from 9/10 to 6/10 s/p indocin. Pt ruled out for labor with CL >4cm as documented by triage provider.    - Pt is s/p 1g CTX at outside hospital  - Pt was discharged with PO antibiotics for UTI, to be continued outpatient  - Pt to be discharged with Indocin x48h  -  labor precautions discussed with patient    D/w Dr. Gunner Marshall PGY3

## 2024-07-17 NOTE — OB PROVIDER TRIAGE NOTE - ADDITIONAL INSTRUCTIONS
Rx for indocin 25 mg given Q6 for 48 hours  Return for worsening of symptoms or fever  Increase fluid intake  Call Clinic when moving [facility] to [place] to make appointment. [facility] [phone]

## 2024-07-17 NOTE — OB PROVIDER TRIAGE NOTE - HISTORY OF PRESENT ILLNESS
28yo  @ 22.1 presents sent via transfer from [place] for R/O PTL. Patient presented to [place] for c/o abdominal pain x 2 days with urinary frequency and burning. Reports she felt the same way in May and was treated for UTI. Denies fever, LOF, VB.   Gets care with provider in [place]. Reports she will be returning there in a week but ultimately wants to come back here to deliver.   Given 1g ceftriazone IV at [place] and 1 g IV tylenol that did not help the pain.   Reports history of multiple large fibroids

## 2024-07-18 PROBLEM — D25.9 LEIOMYOMA OF UTERUS, UNSPECIFIED: Chronic | Status: ACTIVE | Noted: 2024-07-17

## 2024-07-18 RX ORDER — NITROFURANTOIN MACROCRYSTAL 100 MG
1 CAPSULE ORAL
Qty: 14 | Refills: 0
Start: 2024-07-18 | End: 2024-07-24

## 2024-07-18 NOTE — CHART NOTE - NSCHARTNOTEFT_GEN_A_CORE
R3 Event Note    Patient called [facility] line - pt was seen in triage last night with abdominal pain and discharged with Indocin. UA also notable for UTI with +nitrites/mod LE, mod bacteria. Pt requesting antibiotics - Macrobid 100mg BID x7d sent to preferred pharmacy and instructed pt to f/u with OBGYN within the week. Return precautions discussed, including fevers/chills, n/v, inability to tolerate PO, severe abdominal pain, or worsening dysuria. Pt verbalized understanding and all questions answered.    D/w Dr. Delaney Briones, PGY-3

## 2025-07-06 NOTE — ED ADULT NURSE NOTE - NSFALLCONCLUSION_ED_ALL_ED
[employer] Moe Thakkar  Plastic Surgery  [address] [place] [id number]  Phone: [phone]  Fax: [phone]  Follow Up Time: